# Patient Record
Sex: MALE | URBAN - METROPOLITAN AREA
[De-identification: names, ages, dates, MRNs, and addresses within clinical notes are randomized per-mention and may not be internally consistent; named-entity substitution may affect disease eponyms.]

---

## 2020-07-13 ENCOUNTER — HOSPITAL ENCOUNTER (INPATIENT)
Age: 55
LOS: 11 days | Discharge: HOME HEALTH | DRG: 948 | End: 2020-07-24
Attending: PHYSICAL MEDICINE & REHABILITATION | Admitting: PHYSICAL MEDICINE & REHABILITATION
Payer: COMMERCIAL

## 2020-07-13 PROBLEM — R53.81 DEBILITY: Status: ACTIVE | Noted: 2020-07-13

## 2020-07-13 LAB — METER GLUCOSE: 110 MG/DL (ref 74–99)

## 2020-07-13 PROCEDURE — 82962 GLUCOSE BLOOD TEST: CPT

## 2020-07-13 PROCEDURE — 1280000000 HC REHAB R&B

## 2020-07-13 PROCEDURE — 6370000000 HC RX 637 (ALT 250 FOR IP): Performed by: PHYSICAL MEDICINE & REHABILITATION

## 2020-07-13 RX ORDER — CITALOPRAM 20 MG/1
10 TABLET ORAL DAILY
Status: DISCONTINUED | OUTPATIENT
Start: 2020-07-14 | End: 2020-07-24 | Stop reason: HOSPADM

## 2020-07-13 RX ORDER — CHLORTHALIDONE 25 MG/1
25 TABLET ORAL DAILY
Status: DISCONTINUED | OUTPATIENT
Start: 2020-07-14 | End: 2020-07-24 | Stop reason: HOSPADM

## 2020-07-13 RX ORDER — PANTOPRAZOLE SODIUM 40 MG/1
40 TABLET, DELAYED RELEASE ORAL
Status: DISCONTINUED | OUTPATIENT
Start: 2020-07-14 | End: 2020-07-24 | Stop reason: HOSPADM

## 2020-07-13 RX ORDER — QUETIAPINE FUMARATE 25 MG/1
25 TABLET, FILM COATED ORAL 2 TIMES DAILY
Status: DISCONTINUED | OUTPATIENT
Start: 2020-07-13 | End: 2020-07-20

## 2020-07-13 RX ORDER — POLYETHYLENE GLYCOL 3350 17 G/17G
17 POWDER, FOR SOLUTION ORAL DAILY PRN
Status: DISCONTINUED | OUTPATIENT
Start: 2020-07-13 | End: 2020-07-24 | Stop reason: HOSPADM

## 2020-07-13 RX ORDER — ATORVASTATIN CALCIUM 40 MG/1
40 TABLET, FILM COATED ORAL DAILY
Status: DISCONTINUED | OUTPATIENT
Start: 2020-07-14 | End: 2020-07-24 | Stop reason: HOSPADM

## 2020-07-13 RX ORDER — ACETAMINOPHEN 325 MG/1
650 TABLET ORAL EVERY 4 HOURS PRN
Status: DISCONTINUED | OUTPATIENT
Start: 2020-07-13 | End: 2020-07-24 | Stop reason: HOSPADM

## 2020-07-13 RX ORDER — ARFORMOTEROL TARTRATE 15 UG/2ML
15 SOLUTION RESPIRATORY (INHALATION) 2 TIMES DAILY
Status: DISCONTINUED | OUTPATIENT
Start: 2020-07-13 | End: 2020-07-24 | Stop reason: HOSPADM

## 2020-07-13 RX ORDER — CHOLECALCIFEROL (VITAMIN D3) 125 MCG
5 CAPSULE ORAL NIGHTLY PRN
Status: DISCONTINUED | OUTPATIENT
Start: 2020-07-13 | End: 2020-07-24 | Stop reason: HOSPADM

## 2020-07-13 RX ORDER — BUDESONIDE 0.5 MG/2ML
0.5 INHALANT ORAL 2 TIMES DAILY
Status: DISCONTINUED | OUTPATIENT
Start: 2020-07-13 | End: 2020-07-24 | Stop reason: HOSPADM

## 2020-07-13 RX ORDER — OXYCODONE HYDROCHLORIDE 5 MG/1
5 TABLET ORAL EVERY 6 HOURS PRN
Status: DISCONTINUED | OUTPATIENT
Start: 2020-07-13 | End: 2020-07-20

## 2020-07-13 RX ORDER — AMLODIPINE BESYLATE 5 MG/1
5 TABLET ORAL DAILY
Status: DISCONTINUED | OUTPATIENT
Start: 2020-07-14 | End: 2020-07-24 | Stop reason: HOSPADM

## 2020-07-13 RX ADMIN — QUETIAPINE FUMARATE 25 MG: 25 TABLET ORAL at 22:41

## 2020-07-13 ASSESSMENT — PAIN SCALES - GENERAL: PAINLEVEL_OUTOF10: 0

## 2020-07-13 NOTE — LETTER
PORTABLE PATIENT PROFILE  Stew Anglin  [id number]    MEDICAL DIAGNOSIS/CONDITION:   Patient Active Problem List   Diagnosis    Debility    [facility]) [facility]    History of asthma    MAYRA (obstructive sleep apnea)    Essential hypertension    Acute encephalopathy    Colon perforation (HCC)    S/P colostomy ([facility]    Hyponatremia    Hypokalemia    Impaired mobility and ADLs    Moderate protein-calorie malnutrition (HCC)       INSURANCE INFORMATION:  Payor: JOYA /  /  /     ADVANCED DIRECTIVES:      [unfilled]     EMERGENCY CONTACT:       RISK FACTORS:   Social History     Tobacco Use    Smoking status: Not on file   Substance Use Topics    Alcohol use: Not on file       ALLERGIES:  No Known Allergies    IMMUNIZATIONS:    There is no immunization history on file for this patient. SWALLOWING:        VISION AND HEARING:        PHYSICIANS INVOLVED WITH CARE:    Len Han MD  No ref. provider found  No primary care provider on file.   Len Han MD

## 2020-07-13 NOTE — PROGRESS NOTES
Patient oriented to room and new admission folder given. No patient Guide present.  Nereida Wilhelm  7/13/2020  7:07 PM

## 2020-07-14 LAB
ANION GAP SERPL CALCULATED.3IONS-SCNC: 14 MMOL/L (ref 7–16)
ANISOCYTOSIS: ABNORMAL
BACTERIA: ABNORMAL /HPF
BASOPHILS ABSOLUTE: 0 E9/L (ref 0–0.2)
BASOPHILS RELATIVE PERCENT: 0.5 % (ref 0–2)
BILIRUBIN URINE: NEGATIVE
BLOOD, URINE: ABNORMAL
BUN BLDV-MCNC: 18 MG/DL (ref 6–20)
CALCIUM SERPL-MCNC: 9.1 MG/DL (ref 8.6–10.2)
CHLORIDE BLD-SCNC: 90 MMOL/L (ref 98–107)
CHLORIDE URINE RANDOM: 53 MMOL/L
CLARITY: CLEAR
CO2: 24 MMOL/L (ref 22–29)
COLOR: YELLOW
CREAT SERPL-MCNC: 2.1 MG/DL (ref 0.7–1.2)
CREATININE URINE: 77 MG/DL (ref 40–278)
EOSINOPHILS ABSOLUTE: 0.11 E9/L (ref 0.05–0.5)
EOSINOPHILS RELATIVE PERCENT: 0.9 % (ref 0–6)
EPITHELIAL CELLS, UA: ABNORMAL /HPF
GFR AFRICAN AMERICAN: 40
GFR NON-AFRICAN AMERICAN: 33 ML/MIN/1.73
GLUCOSE BLD-MCNC: 110 MG/DL (ref 74–99)
GLUCOSE URINE: NEGATIVE MG/DL
HCT VFR BLD CALC: 33.4 % (ref 37–54)
HEMOGLOBIN: 10.7 G/DL (ref 12.5–16.5)
HOWELL-JOLLY BODIES: ABNORMAL
HYPOCHROMIA: ABNORMAL
INR BLD: 1.2
KETONES, URINE: NEGATIVE MG/DL
LEUKOCYTE ESTERASE, URINE: NEGATIVE
LYMPHOCYTES ABSOLUTE: 4.7 E9/L (ref 1.5–4)
LYMPHOCYTES RELATIVE PERCENT: 36.6 % (ref 20–42)
MAGNESIUM: 1.9 MG/DL (ref 1.6–2.6)
MCH RBC QN AUTO: 28.5 PG (ref 26–35)
MCHC RBC AUTO-ENTMCNC: 32 % (ref 32–34.5)
MCV RBC AUTO: 89.1 FL (ref 80–99.9)
METER GLUCOSE: 112 MG/DL (ref 74–99)
METER GLUCOSE: 130 MG/DL (ref 74–99)
MONOCYTES ABSOLUTE: 1.65 E9/L (ref 0.1–0.95)
MONOCYTES RELATIVE PERCENT: 13.4 % (ref 2–12)
NEUTROPHILS ABSOLUTE: 6.22 E9/L (ref 1.8–7.3)
NEUTROPHILS RELATIVE PERCENT: 49.1 % (ref 43–80)
NITRITE, URINE: NEGATIVE
OSMOLALITY URINE: 303 MOSM/KG (ref 300–900)
PARATHYROID HORMONE INTACT: 29 PG/ML (ref 15–65)
PDW BLD-RTO: 16 FL (ref 11.5–15)
PH UA: 5.5 (ref 5–9)
PHOSPHORUS: 3.2 MG/DL (ref 2.5–4.5)
PLATELET # BLD: 535 E9/L (ref 130–450)
PMV BLD AUTO: 10.2 FL (ref 7–12)
POIKILOCYTES: ABNORMAL
POLYCHROMASIA: ABNORMAL
POTASSIUM REFLEX MAGNESIUM: 3 MMOL/L (ref 3.5–5)
POTASSIUM, UR: 45.2 MMOL/L
PROTEIN UA: ABNORMAL MG/DL
PROTHROMBIN TIME: 13.2 SEC (ref 9.3–12.4)
RBC # BLD: 3.75 E12/L (ref 3.8–5.8)
RBC UA: ABNORMAL /HPF (ref 0–2)
SODIUM BLD-SCNC: 128 MMOL/L (ref 132–146)
SODIUM URINE: 32 MMOL/L
SPECIFIC GRAVITY UA: 1.01 (ref 1–1.03)
TARGET CELLS: ABNORMAL
UROBILINOGEN, URINE: 0.2 E.U./DL
WBC # BLD: 12.7 E9/L (ref 4.5–11.5)
WBC UA: ABNORMAL /HPF (ref 0–5)

## 2020-07-14 PROCEDURE — 97530 THERAPEUTIC ACTIVITIES: CPT

## 2020-07-14 PROCEDURE — 94640 AIRWAY INHALATION TREATMENT: CPT

## 2020-07-14 PROCEDURE — 83935 ASSAY OF URINE OSMOLALITY: CPT

## 2020-07-14 PROCEDURE — 97162 PT EVAL MOD COMPLEX 30 MIN: CPT

## 2020-07-14 PROCEDURE — 84300 ASSAY OF URINE SODIUM: CPT

## 2020-07-14 PROCEDURE — 36415 COLL VENOUS BLD VENIPUNCTURE: CPT

## 2020-07-14 PROCEDURE — 97166 OT EVAL MOD COMPLEX 45 MIN: CPT

## 2020-07-14 PROCEDURE — 83735 ASSAY OF MAGNESIUM: CPT

## 2020-07-14 PROCEDURE — 1280000000 HC REHAB R&B

## 2020-07-14 PROCEDURE — 97606 NEG PRS WND THER DME>50 SQCM: CPT

## 2020-07-14 PROCEDURE — 97535 SELF CARE MNGMENT TRAINING: CPT

## 2020-07-14 PROCEDURE — 84100 ASSAY OF PHOSPHORUS: CPT

## 2020-07-14 PROCEDURE — 84133 ASSAY OF URINE POTASSIUM: CPT

## 2020-07-14 PROCEDURE — 85025 COMPLETE CBC W/AUTO DIFF WBC: CPT

## 2020-07-14 PROCEDURE — 87088 URINE BACTERIA CULTURE: CPT

## 2020-07-14 PROCEDURE — 80048 BASIC METABOLIC PNL TOTAL CA: CPT

## 2020-07-14 PROCEDURE — 82962 GLUCOSE BLOOD TEST: CPT

## 2020-07-14 PROCEDURE — 6360000002 HC RX W HCPCS: Performed by: PHYSICAL MEDICINE & REHABILITATION

## 2020-07-14 PROCEDURE — 92523 SPEECH SOUND LANG COMPREHEN: CPT

## 2020-07-14 PROCEDURE — 85610 PROTHROMBIN TIME: CPT

## 2020-07-14 PROCEDURE — 82570 ASSAY OF URINE CREATININE: CPT

## 2020-07-14 PROCEDURE — 81001 URINALYSIS AUTO W/SCOPE: CPT

## 2020-07-14 PROCEDURE — 97605 NEG PRS WND THER DME<=50SQCM: CPT

## 2020-07-14 PROCEDURE — 82436 ASSAY OF URINE CHLORIDE: CPT

## 2020-07-14 PROCEDURE — 2580000003 HC RX 258: Performed by: INTERNAL MEDICINE

## 2020-07-14 PROCEDURE — 99223 1ST HOSP IP/OBS HIGH 75: CPT | Performed by: PHYSICAL MEDICINE & REHABILITATION

## 2020-07-14 PROCEDURE — 97110 THERAPEUTIC EXERCISES: CPT

## 2020-07-14 PROCEDURE — 6370000000 HC RX 637 (ALT 250 FOR IP): Performed by: PHYSICAL MEDICINE & REHABILITATION

## 2020-07-14 PROCEDURE — 94664 DEMO&/EVAL PT USE INHALER: CPT

## 2020-07-14 PROCEDURE — 83970 ASSAY OF PARATHORMONE: CPT

## 2020-07-14 RX ORDER — ONDANSETRON 4 MG/1
4 TABLET, ORALLY DISINTEGRATING ORAL EVERY 6 HOURS PRN
Status: DISCONTINUED | OUTPATIENT
Start: 2020-07-14 | End: 2020-07-24 | Stop reason: HOSPADM

## 2020-07-14 RX ORDER — SODIUM CHLORIDE 9 MG/ML
INJECTION, SOLUTION INTRAVENOUS CONTINUOUS
Status: DISCONTINUED | OUTPATIENT
Start: 2020-07-14 | End: 2020-07-18

## 2020-07-14 RX ORDER — HEPARIN SODIUM 10000 [USP'U]/ML
5000 INJECTION, SOLUTION INTRAVENOUS; SUBCUTANEOUS EVERY 8 HOURS
Status: DISCONTINUED | OUTPATIENT
Start: 2020-07-14 | End: 2020-07-20

## 2020-07-14 RX ADMIN — AMLODIPINE BESYLATE 5 MG: 5 TABLET ORAL at 08:38

## 2020-07-14 RX ADMIN — Medication 5 MG: at 21:23

## 2020-07-14 RX ADMIN — POTASSIUM BICARBONATE 20 MEQ: 782 TABLET, EFFERVESCENT ORAL at 21:20

## 2020-07-14 RX ADMIN — HEPARIN SODIUM 5000 UNITS: 10000 INJECTION INTRAVENOUS; SUBCUTANEOUS at 23:54

## 2020-07-14 RX ADMIN — POTASSIUM BICARBONATE 20 MEQ: 782 TABLET, EFFERVESCENT ORAL at 11:08

## 2020-07-14 RX ADMIN — ONDANSETRON 4 MG: 4 TABLET, ORALLY DISINTEGRATING ORAL at 14:35

## 2020-07-14 RX ADMIN — CHLORTHALIDONE 25 MG: 25 TABLET ORAL at 08:38

## 2020-07-14 RX ADMIN — BUDESONIDE 500 MCG: 0.5 SUSPENSION RESPIRATORY (INHALATION) at 20:10

## 2020-07-14 RX ADMIN — QUETIAPINE FUMARATE 25 MG: 25 TABLET ORAL at 21:20

## 2020-07-14 RX ADMIN — QUETIAPINE FUMARATE 25 MG: 25 TABLET ORAL at 08:38

## 2020-07-14 RX ADMIN — OXYCODONE 5 MG: 5 TABLET ORAL at 23:53

## 2020-07-14 RX ADMIN — PANTOPRAZOLE SODIUM 40 MG: 40 TABLET, DELAYED RELEASE ORAL at 06:34

## 2020-07-14 RX ADMIN — HEPARIN SODIUM 5000 UNITS: 10000 INJECTION INTRAVENOUS; SUBCUTANEOUS at 15:59

## 2020-07-14 RX ADMIN — ATORVASTATIN CALCIUM 40 MG: 40 TABLET, FILM COATED ORAL at 08:39

## 2020-07-14 RX ADMIN — ONDANSETRON 4 MG: 4 TABLET, ORALLY DISINTEGRATING ORAL at 07:04

## 2020-07-14 RX ADMIN — Medication 5 MG: at 03:30

## 2020-07-14 RX ADMIN — BUDESONIDE 500 MCG: 0.5 SUSPENSION RESPIRATORY (INHALATION) at 08:23

## 2020-07-14 RX ADMIN — CITALOPRAM 10 MG: 20 TABLET, FILM COATED ORAL at 08:39

## 2020-07-14 RX ADMIN — HEPARIN SODIUM 5000 UNITS: 10000 INJECTION INTRAVENOUS; SUBCUTANEOUS at 07:05

## 2020-07-14 RX ADMIN — ARFORMOTEROL TARTRATE 15 MCG: 15 SOLUTION RESPIRATORY (INHALATION) at 08:23

## 2020-07-14 RX ADMIN — ARFORMOTEROL TARTRATE 15 MCG: 15 SOLUTION RESPIRATORY (INHALATION) at 20:10

## 2020-07-14 RX ADMIN — SODIUM CHLORIDE: 9 INJECTION, SOLUTION INTRAVENOUS at 17:47

## 2020-07-14 ASSESSMENT — PAIN DESCRIPTION - PAIN TYPE: TYPE: ACUTE PAIN;SURGICAL PAIN

## 2020-07-14 ASSESSMENT — PAIN DESCRIPTION - FREQUENCY: FREQUENCY: CONTINUOUS

## 2020-07-14 ASSESSMENT — PAIN SCALES - GENERAL
PAINLEVEL_OUTOF10: 7
PAINLEVEL_OUTOF10: 0
PAINLEVEL_OUTOF10: 0

## 2020-07-14 ASSESSMENT — PAIN DESCRIPTION - ONSET: ONSET: ON-GOING

## 2020-07-14 ASSESSMENT — PAIN DESCRIPTION - DESCRIPTORS: DESCRIPTORS: ACHING;CONSTANT

## 2020-07-14 ASSESSMENT — PAIN DESCRIPTION - ORIENTATION: ORIENTATION: LOWER;MID

## 2020-07-14 ASSESSMENT — PAIN DESCRIPTION - PROGRESSION: CLINICAL_PROGRESSION: NOT CHANGED

## 2020-07-14 ASSESSMENT — PAIN DESCRIPTION - LOCATION: LOCATION: BACK

## 2020-07-14 NOTE — PROGRESS NOTES
OCCUPATIONAL THERAPY INITIAL EVALUATION      Date:2020  Patient Name: Ghulam Sommer  MRN: [id number]  : 1965  Room: [address] [place] [address]-A    Discharge Recommendations: TBD as pt progresses - Consider HH OT  Frequency / Duration: BID 5-7 tx/wk; 3 weeks  Recommended Adaptive Equipment: TBD as pt progresses - Shower Chair, Adaptive equipment         Diagnosis: Debility    HPI/PROCEDURES: History obtained from outside hospital chart and patient. 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 He recently had a few week history of chest pain and SOB and was found to have a large diaphragmatic hernia. He underwent repair with mesh and lower pulmonary wedge and bullae resection with cryoablation and left chest tube placement on 6/3/2020 at [facility] in [place]. Post operative course was noted for ARLENE. He developed acute hypoxic respiratory failure on 2020 and was transferred to SICU requiring intubation. Respiratory cultures grew E. coli and H. Influenza. On 2020 he was found to have bowel herniation into thoracic cavity with perforation and stool draining from his chest tube. He went back to the OR for open redo diaphragmatic hernia repair and partial colectomy for ischemic transverse colon with stoma placement on 6/10/2020. He then became septic, found to be in septic shock. Per notes he was treated with antibiotics per ID recommendations, weaned off pressors and extubated. He then had an episode of ARF due to mucous plugging requiring intubation and bronchoscopy. He was again extubated on 2020. He was reintubated on  due to desaturations and chest xray showed opacification of the left hemithorax. He again underwent bronchoscopy showing mucous plugging. He improved after bronchoscopy and lavage and he was extubated on . Course noted for acute encephalopathy.  He maintained a wound vac to his left thoracotomy site and an abdominal wound vac on the Equipment Needs, Energy Conservation Techniques, Pt/Family Education, Ther Ex., endurance and balance ex. OT role and POC reviewed. Patient verbalized/demonstrated Good understanding of all education provided. Long Term Goals: 3 weeks  1. Pt will complete Self-Feeding Mod I  2. Pt will complete Hygiene/Grooming Mod I while standing at the sink w/ use of AD PRN  3. Pt will complete UB/LB Dressing/Bathing, Toileting Mod I while seated/standing PRN w/ use of DME/AD/AE PRN  4. Pt will complete Toilet/Walk-in Shower Transfers Mod I w/ use of DME/PRN  5. Pt will INDly demo Good Activity Tolerance, Good Safety Awareness w/ completion of all Functional Ax. Assessment of current deficits   Functional mobility [x]  ROM [] Strength [x]  Cognition []  ADLs [x]   IADLs [x] Safety Awareness [] Endurance [x]  Fine Motor Coordination [] Balance [x] Vision/perception [] Sensation []   Gross Motor Coordination []      Plan of Care:   ADL retraining [x]   Equipment needs [x]   Neuromuscular re-education [] Energy Conservation Techniques [x]  Functional Transfer training [x] Patient and/or Family Education [x]  Functional Mobility training [x]  Environmental Modifications [x]  Cognitive re-training []   Compensatory techniques for ADLs [x]  Splinting Needs []   Positioning/joint protection [x]  Other: []     Upon arrival, pt was found in supine. He was agreeable to participate in therapeutic ax. No family members present during session. Received permission from RN prior to engaging pt in OT services. Treatment/ Education Provided:      -- Education:  Provided Pt/Family ed re: Benefits/Purpose of OT services;  OT Plan of Care;  Oriented pt to General Rehab Program/Therapy Schedule; Reviewed Precautions and techs to adhere to precautions w/ completion of ADLs/Mobility; Techs to increase Safety/Safety Awareness w/ ADLs, Functional Transfers and Functional Mobility;   Walker safety w/ Functional Activity/Mobility;

## 2020-07-14 NOTE — FLOWSHEET NOTE
Inpatient Wound Care(initial evaluation)  [id number]    Admit Date: 7/13/2020  6:38 PM    Reason for consult:  Wound vac management, new colostomy    Wound history:  Transferred from [facility] for rehab after multiple surgeries    Findings:     07/14/20 1305   Wound 07/13/20 Abdomen Mid;Proximal   Date First Assessed/Time First Assessed: 07/13/20 2130   Present on Hospital Admission: Yes  Primary Wound Type: Surgical Type  Location: Abdomen  Wound Location Orientation: Mid;Proximal   Wound Image    Wound Non-Healing Surgical   Dressing Changed Changed/New   Dressing/Treatment Vacuum dressing   Wound Cleansed Rinsed/Irrigated with saline   Dressing Change Due 07/17/20   Wound Length (cm) 3.1 cm   Wound Width (cm) 2 cm   Wound Depth (cm) 1.6 cm   Wound Surface Area (cm^2) 6.2 cm^2   Wound Volume (cm^3) 9.92 cm^3   Wound Assessment Red;Slough   Drainage Amount Scant   Drainage Description Serosanguinous   Odor None   Alejandra-wound Assessment Intact   Red%Wound Bed 40   Yellow%Wound Bed 60   Wound 07/13/20 Chest Left;Lateral   Date First Assessed/Time First Assessed: 07/13/20 2130   Present on Hospital Admission: Yes  Location: Chest  Wound Location Orientation: Left;Lateral   Wound   (old chest tube site per chart)   Dressing Changed Changed/New   Dressing/Treatment Vacuum dressing   Wound Cleansed Rinsed/Irrigated with saline   Wound Length (cm) 0.8 cm   Wound Width (cm) 0.6 cm   Wound Depth (cm) 3 cm   Wound Surface Area (cm^2) 0.48 cm^2   Wound Volume (cm^3) 1.44 cm^3   Wound Assessment Red   Drainage Amount None   Alejandra-wound Assessment Intact   Red%Wound Bed 100   Wound 07/13/20 Left;Lateral rib cage   Date First Assessed/Time First Assessed: 07/13/20 2130   Present on Hospital Admission: Yes  Wound Location Orientation: Left;Lateral  Wound Description (Comments): rib cage   Wound Image    Wound Non-Healing Surgical   Dressing Changed Changed/New   Dressing/Treatment Vacuum dressing   Wound Cleansed Rinsed/Irrigated with saline   Wound Length (cm) 2 cm   Wound Width (cm) 14 cm   Wound Depth (cm) 5 cm   Wound Surface Area (cm^2) 28 cm^2   Wound Volume (cm^3) 140 cm^3   Wound Assessment Red   Drainage Amount None   Alejandra-wound Assessment Intact   Red%Wound Bed 100   Wound 07/14/20 Abdomen Distal;Mid   Date First Assessed/Time First Assessed: 07/14/20 1305   Present on Hospital Admission: Yes  Primary Wound Type: Surgical Type  Location: Abdomen  Wound Location Orientation: Distal;Mid   Wound Image   (see proximal)   Wound Non-Healing Surgical   Dressing Changed Changed/New   Dressing/Treatment Vacuum dressing   Wound Cleansed Rinsed/Irrigated with saline   Wound Length (cm) 10 cm   Wound Width (cm) 6.2 cm   Wound Depth (cm) 5.6 cm   Wound Surface Area (cm^2) 62 cm^2   Wound Volume (cm^3) 347.2 cm^3   Wound Assessment Red;Slough   Drainage Amount Scant   Drainage Description Serosanguinous   Odor None   Alejandra-wound Assessment   (necrotic wound edges 7-11)   Red%Wound Bed 40   Yellow%Wound Bed 60   Negative Pressure Wound Therapy Left;Lateral   Placement Date/Time: 07/14/20 1305   Pre-existing: No  Inserted by: rib cage area  Wound Location Orientation: Left;Lateral   $ Standard NPWT <=50 sq cm PER TX $ Yes   Wound Type Surgical   Dressing Type Black foam   Number of pieces used 3   Cycle Continuous   Target Pressure (mmHg) 125   Canister changed?   (new)   Dressing Changed Changed/New   Negative Pressure Wound Therapy Abdomen Mid;Proximal;Distal   Placement Date/Time: 07/14/20 1305   Pre-existing: No  Inserted by: times 2 sites  Location: Abdomen  Wound Location Orientation: Mid;Proximal;Distal   $ Standard NPWT >50 sq cm PER TX $ Yes   Wound Type Surgical   Dressing Type Black foam   Number of pieces used 6   Cycle Continuous   Target Pressure (mmHg) 125   Canister changed?   (new)   Colostomy RUQ Transverse   Placement Date: 06/09/20   Pre-existing: Yes  Location: RUQ  Colostomy Type: Transverse   Stomal Appliance 2 piece; Changed Stoma  Assessment Moist;Protrudes; Red   Mucocutaneous Junction Separation   Peristomal Assessment Intact   Treatment Bag change  (skin care)   Stool Appearance Soft   Stool Color Brown   Stool Amount Medium     **Informed Consent**    The patient has given verbal consent to have photos taken of wounds and inserted into their chart as part of their permanent medical record for purposes of documentation, treatment management and/or medical review. All Images taken on 7/14/20 of patient name: Kimberly Skates were transmitted and stored on secured [employer] located within [facility] by a registered [employer] Mobile Application Device.      Plan:  Next wound vac change Friday  Will follow for wound vac management and ostomy teaching closer to discharge    Swathi Correa 7/14/2020 1:53 PM

## 2020-07-14 NOTE — PROGRESS NOTES
Physical Therapy  Daily Treatment Note   Evaluating Therapist: Colby Kwong, FRANCHESCA [id number]    NAME: Julianne De Santiago  ROOM: 3793A  DIAGNOSIS: Debility  PRECAUTIONS: Falls, wound vac   HPI/PROCUDRES: History obtained from outside hospital chart and patient. 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 He recently had a few week history of chest pain and SOB and was found to have a large diaphragmatic hernia. He underwent repair with mesh and lower pulmonary wedge and bullae resection with cryoablation and left chest tube placement on 6/3/2020 at [facility]. Post operative course was noted for ARLENE. He developed acute hypoxic respiratory failure on 6/5/2020 and was transferred to SICU requiring intubation. Respiratory cultures grew E. coli and H. Influenza. On 6/9/2020 he was found to have bowel herniation into thoracic cavity with perforation and stool draining from his chest tube. He went back to the OR for open redo diaphragmatic hernia repair and partial colectomy for ischemic transverse colon with stoma placement on 6/10/2020. He then became septic, found to be in septic shock. Per notes he was treated with antibiotics per ID recommendations, weaned off pressors and extubated. He then had an episode of ARF due to mucous plugging requiring intubation and bronchoscopy. He was again extubated on 6/12/2020. He was reintubated on 6/22 due to desaturations and chest xray showed opacification of the left hemithorax. He again underwent bronchoscopy showing mucous plugging. He improved after bronchoscopy and lavage and he was extubated on 6/25. Course noted for acute encephalopathy. He maintained a wound vac to his left thoracotomy site and an abdominal wound vac on the laparotomy site, changed every Tues, Thurs, and Sat, along with packing of the original chest tube site. He completed antibiotic course per ID on 7/1/2020.  He passed MBS on 7/6 and was started on a liquid diet, which was advanced to a soft diet on 7/9. Social:  Pt lives with his wife in a ranch style floor plan with 1 step and no rails to enter. 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  714/20      P. M. Short Term Goals Long Term Goals    Was pt agreeable to Eval/treatment? yes Initial Evaluation yes     Does pt have pain? No c/o pain but reported nausea  Pt c/o nausea     Bed Mobility  Rolling: SBA  Supine to sit: SBA  Sit to supine: SBA  Scooting: SBA  Rolling SBA  Sit to supine SBA  Scooting SBA Supervision Independent   Transfers Sit to stand: CGA  Stand to sit: CGA  Stand pivot: CGA with [employer]  Sit to stand SBA  Stand to sit SBA  Stand pivot CGA Supervision Independent   Ambulation   20 feet with WW with CGA  50 feet x1 and 30 feet x1 with ww  feet with no AD with Supervision >200 feet with no AD with Independent   Walking 10 feet on uneven surface 10 feet with WW with CGA  N/T 10 feet with no AD with Supervision 10 feet with no AD with Independent   Wheel Chair Mobility NT  N/T     Car Transfers NT  N/T Supervision Independent   Stair negotiation: ascended and descended 4 steps with 2 rail with CGA - backward descending  2 steps 2 rails descending backwards SBA 4 steps with 1 rail with Supervision 4 steps with 1 rail with Mod Independent   Curb Step:   ascended and descended 7.5 inch step with [employer] and CGA  N/T 7.5 inch step with no AD and Supervision 7.5 inch step with no AD and Independent   Picking up object off the floor Picked up a cone with CGA  N/T Will  an object with Supervision Will  an object with Independent   BLE ROM WFL       BLE Strength 4+/5       Balance  Sitting: Supervision  Standing: CGA with [employer]  Sitting Sup  Standing CGA     Date Family Teach Completed TBA  TBA     Is additional Family Teaching Needed?   Y or N Y  yes     Hindering Progress Weakness, debility  debility     PT recommended ELOS 2-3 weeks Team's Discharge Plan        Therapist at Team Meeting          Pt is alert and Oriented x3  Sensation: Unremarkable  Edema: Unremarkable  Endurance: Fair  Skin was inspected: Unremarkable  Chair alarm: NA          Comments:  Pt c/o fatigue throughout tx session. Pt still c/o nausea but decreased. Increased amb distance this p.m. Assisted pt back to bed at end of tx session. Patient education  Pt educated on role of PT, sit<>stand technique, benefits of continued PT services to progress toward goals    Patient response to education:   Pt verbalized understanding Pt demonstrated skill Pt requires further education in this area   eys With cues yes     Rehab potential is Good for reaching above PT goals. PLAN  PT care will be provided in accordance with the objectives noted above. Whenever appropriate, clear delegation orders will be provided for nursing staff. Exercises and functional mobility practice will be used as well as appropriate assistive devices or modalities to obtain goals. Patient and family education will also be administered as needed. Frequency of treatments will be 2x/day M-F and 1x/day Sat or Sun x  2-3 weeks.     Time in: 14:50  Time out: 15:35    Monique Duncan [id number]

## 2020-07-14 NOTE — PROGRESS NOTES
Occupational Therapy  OCCUPATIONAL THERAPY DAILY NOTE    Date:2020  Patient Name: Maya Rutledge  MRN: [id number]  : 1965  Room: [address] [place] [address]A     Patient Active Problem List   Diagnosis    Debility       Precautions: falls, abdominal precautions    Functional Assessment:   Date Status AE  Comments   Feeding 20 Set up/Sup  Per eval   Grooming 20 Set up/Sup  ''   Bathing 20 Mod A  ''   UB Dressing 20 Min A  ''   LB Dressing 20 Mod A   ''   Homemaking         Functional Transfers / Balance:   Date Status DME  Comments   Sit Balance 20 Close S  Per eval   Stand Balance 20 Min A ww ''   [] Tub  [] Shower   Transfer       Commode   Transfer 20 Mod A  ''   Functional   Mobility 20 Min A ww    Other:  Bed>WC   20   Min A   ww      Functional Exercises / Activity:  Mod resistive mary bar X 20 reps on 4 planes to increase BUE forearm, wrist and  strength for independence with functional transfers and tasks. 2# free weight 3 X 10 shoulder flexion ad scapular protraction/retraction to increase endurance and BUE strength for independence with transfers and ADLs. Sensory / Neuromuscular Re-Education:      Cognitive Skills:   Status Comments   Problem   Solving good     Memory good     Sequencing good     Safety good      Visual Perception:    Education:  Pt educated on safety during functional transfers. [] Family teach completed on:    Pain Level: 0/10 c/o pain. Pt c/o nausea- nursing notified. Additional Notes:       Patient has made good  progress during treatment sessions toward set goals. Therapy emphasis to obtain goals:ADLs, transfers, mobility, strengthening, endurance and LUE ROM. [x] Continue with current OT Plan of care.   [] Prepare for Discharge     DISCHARGE RECOMMENDATIONS  Recommended DME:    Post Discharge Care:   []Home Independently  []Home with 24hr Care / Supervision []Home with Partial Supervision []Home with Home Health OT []Home with Out Pt OT []Other: ___   Comments:         Time in Time out Tx Time Breakdown  Variance:   First Session    [] Individual Tx-   [] Concurrent Tx -  [] Co-Tx -   [] Group Tx -   [] Time Missed -     Second Session 2:00 2:30 [x] Individual Tx- 30 Mins  [] Concurrent Tx -  [] Co-Tx -   [] Group Tx -   [] Time Missed -     Third Session    [] Individual Tx-   [] Concurrent Tx -  [] Co-Tx -   [] Group Tx -   [] Time Missed -         Total Tx Time 30 Mins        Pete MACHUCA/KRISTIN [id number]

## 2020-07-14 NOTE — PROGRESS NOTES
SPEECH/LANGUAGE PATHOLOGY  SPEECH/LANGUAGE/COGNITIVE EVALUATION      PATIENT NAME:  Brayan Puri      :  1965         TODAY'S DATE:  2020 ROOM:  [facility]/5502-A     ADMITTING DIAGNOSIS: Debility [R53.81]    SPEECH PATHOLOGY DIAGNOSIS:    Minimal cognitive deficits; decreased motivation negatively impacted evaluation. THERAPY RECOMMENDATIONS:   Pt achieved a CLQT composite severity rating of 3.4, whereas 3.5 is considered WNL. Patient's mid to low motivation to participate should be noted in consideration of scores. Patient feels that he has not noticed any changes or deficits in personal performance. Patient does not wish to pursue ST intervention at this time. OT/PT to address any arising cognitive issues during functional tasks. FIMS SCORES      Swallowing    Current Status  TBA    Short Term Goal TBA    Long Term Goal TBA    Receptive    Current Status  7--Independent    Short Term Goal 7--Independent    Long Term Goal 7--Independent     Expressive    Current Status  7--Independent    Short Term Goal 7--Independent    Long Term Goal 7--Independent     Social Interaction    Current Status  6--Modified Independent    Short Term Goal 6--Modified Independent    Long Term Goal 6--Modified Independent         Problem Solving    Current Status  6--Modified Independent / 5- Supervision    Short Term Goal 6--Modified Independent     Long Term Goal 6--Modified Independent      Memory    Current Status  6--Modified Independent / 5- Supervision    Short Term Goal 6--Modified Independent       Long Term Goal 6--Modified Independent      Cognitive-Linguistic Quick Test (CLQT)     CLQT was administered by Speech-Language Pathologist (SLP). This assessment looks at various cognitive domains including attention, memory, executive functions, language, visuospatial skills as well as assess accuracy during a clock draw task.  Various subtests are utilized to assess for deficits in these 5 domains during different tasks. The severity rating considered for individual age of the patient (18-69 years and 70-89 years). Below are patients scores for the appropriate age group. Attention: 3 Mild  Memory: 3 Mild   Executive Function:  4- Withing Normal Limites (WNL)  Language: 3 Mild  Visuospatial Skills: 4- Withing Normal Limites (WNL)    Composite Severity Rating:   3.4- 2.5 / Mild Deficits     Composite score is based on the sum of all individual cognitive domain scores noted above, and divided by 5. Pt achieved a composite severity rating of 3.4, whereas 3.5 is considered WNL. During some subtests, pt chose to discontinue participation while he still had time remaining to complete the task. Clock Drawing composite Score:   Moderate

## 2020-07-14 NOTE — CARE COORDINATION
Social Work Assessment Summary    PCP: Dr. Neftaly Haji ([place], [place])    Patient Resides: with his spouse, Silva Lara (63). They have (2) cats. Home Architecture : They live in a [facility] with (1) entry step (0) rails. He has access to a tub/shower with curtain or a walk in shower with curtain. He prefers walk in. Will you return to your own home? Yes        Primary Caregiver: Silva Lara (60) is an NP at [employer]. They have (2) children. Selvin Chris (27) stationed at [employer], [place] active [employer]. Lenny Yun (22) just graduated from [employer] and recently got . Level of Function PTA : Pt. Reports he was independent prior to admission. 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 DME Pta  : None    Community Agency Involvement PTA : None     Do they have a medical profile: No    Family Teaching: to be scheduled    Strength: \"Motivation\"    Preference: \"To get strength and have a normal life again\"      NAME RELATION HOME # WORK # CELL #   Kayden Farias spouse   [phone]   Lindsay Sam son Verónica Drummond [place]  [phone]     Height: 5'7  Weight: 197    Pt's mailing address:  [facility] [address] [place] [address].     Carlos Dickens  7/14/2020

## 2020-07-14 NOTE — PROGRESS NOTES
Physical Therapy  Initial Evaluation  Evaluating Therapist: Eliu Johnson DPT [id number]    NAME: Jojo Yap  ROOM: [id number]  DIAGNOSIS: Debility  PRECAUTIONS: Falls, per staff member on 7/14/20 pt may be having a new wound vac placed? HPI/PROCUDRES: History obtained from outside hospital chart and patient. 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 He recently had a few week history of chest pain and SOB and was found to have a large diaphragmatic hernia. He underwent repair with mesh and lower pulmonary wedge and bullae resection with cryoablation and left chest tube placement on 6/3/2020 at [facility] in [facility]. Post operative course was noted for ARLENE. He developed acute hypoxic respiratory failure on 6/5/2020 and was transferred to SICU requiring intubation. Respiratory cultures grew E. coli and H. Influenza. On 6/9/2020 he was found to have bowel herniation into thoracic cavity with perforation and stool draining from his chest tube. He went back to the OR for open redo diaphragmatic hernia repair and partial colectomy for ischemic transverse colon with stoma placement on 6/10/2020. He then became septic, found to be in septic shock. Per notes he was treated with antibiotics per ID recommendations, weaned off pressors and extubated. He then had an episode of ARF due to mucous plugging requiring intubation and bronchoscopy. He was again extubated on 6/12/2020. He was reintubated on 6/22 due to desaturations and chest xray showed opacification of the left hemithorax. He again underwent bronchoscopy showing mucous plugging. He improved after bronchoscopy and lavage and he was extubated on 6/25. Course noted for acute encephalopathy. He maintained a wound vac to his left thoracotomy site and an abdominal wound vac on the laparotomy site, changed every Tues, Thurs, and Sat, along with packing of the original chest tube site.  He completed antibiotic course per ID on 7/1/2020. He passed MBS on 7/6 and was started on a liquid diet, which was advanced to a soft diet on 7/9. Social:  Pt lives with his wife in a ranch style floor plan with 1 step and no rails to enter. 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  714/20          Short Term Goals Long Term Goals    Was pt agreeable to Eval/treatment? yes Initial Evaluation Initial Evaluation     Does pt have pain? No c/o pain but reported nausea       Bed Mobility  Rolling: SBA  Supine to sit: SBA  Sit to supine: SBA  Scooting: SBA   Supervision Independent   Transfers Sit to stand: CGA  Stand to sit: CGA  Stand pivot: CGA with [employer]   Supervision Independent   Ambulation   20 feet with [employer] with CGA   100 feet with no AD with Supervision >200 feet with no AD with Independent   Walking 10 feet on uneven surface 10 feet with WW with CGA   10 feet with no AD with Supervision 10 feet with no AD with Independent   Wheel Chair Mobility NT       Car Transfers NT   Supervision Independent   Stair negotiation: ascended and descended 4 steps with 2 rail with CGA - backward descending   4 steps with 1 rail with Supervision 4 steps with 1 rail with Mod Independent   Curb Step:   ascended and descended 7.5 inch step with [employer] and CGA   7.5 inch step with no AD and Supervision 7.5 inch step with no AD and Independent   Picking up object off the floor Picked up a cone with CGA   Will  an object with Supervision Will  an object with Independent   BLE ROM WFL       BLE Strength 4+/5       Balance  Sitting: Supervision  Standing: CGA with [employer]       Date Family Teach Completed TBA       Is additional Family Teaching Needed?   Y or N Y       Hindering Progress Weakness, debility       PT recommended ELOS 2-3 weeks       Team's Discharge Plan        Therapist at Team Meeting          Pt is alert and Oriented x3  Sensation: Unremarkable  Edema: Unremarkable  Endurance: Fair  Skin was inspected: Unremarkable  Chair alarm: NA     ASSESSMENT  Pt displays functional ability as noted in the objective portion of this evaluation. Comments:  Pt was found lying supine in bed and agreeable to PT eval. Pt required increased time to complete functional mobility due to weakness and fatigue. Pt at times did report nausea specifically when staring straight ahead or up towards the ceiling. Pt required frequent rest breaks during eval due to nausea and fatigue. Pt overall has good understanding of his deficits as well as general safety awareness. Pt is eager to get better and discharge home. At this time pt has strength and endurance deficits that are affecting his overall level of functional mobility. Pt will require skilled PT services to improve upon deficits as noted above and progress toward goals. Patient education  Pt educated on role of PT, sit<>stand technique, benefits of continued PT services to progress toward goals    Patient response to education:   Pt verbalized understanding Pt demonstrated skill Pt requires further education in this area   eys With cues yes     Rehab potential is Good for reaching above PT goals. Pts/ family goals   1. To get better and go home    Patient and or family understand(s) diagnosis, prognosis, and plan of care: yes    PLAN  PT care will be provided in accordance with the objectives noted above. Whenever appropriate, clear delegation orders will be provided for nursing staff. Exercises and functional mobility practice will be used as well as appropriate assistive devices or modalities to obtain goals. Patient and family education will also be administered as needed. Frequency of treatments will be 2x/day M-F and 1x/day Sat or Sun x  2-3 weeks.     Time in: 1000  Time out: [address] [place], [place] [id number]

## 2020-07-14 NOTE — H&P
History and Physical Exam    Date of Admission: 7/14/2020  Primary Care Physician: No primary care provider on file. Attending Physician:  Lillian Singh MD    Chief Complaint:   Impairments and activities limitations after prolonged hospital stay with acute encephalopathy and general debility      History of Present Illness:  History obtained from outside hospital chart and patient. 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 He recently had a few week history of chest pain and SOB and was found to have a large diaphragmatic hernia. He underwent repair with mesh and lower pulmonary wedge and bullae resection with cryoablation and left chest tube placement on 6/3/2020 at [facility] in [facility] [employer] [facility]. Post operative course was noted for ARLENE. He developed acute hypoxic respiratory failure on 6/5/2020 and was transferred to SICU requiring intubation. Respiratory cultures grew E. coli and H. Influenza. On 6/9/2020 he was found to have bowel herniation into thoracic cavity with perforation and stool draining from his chest tube. He went back to the OR for open redo diaphragmatic hernia repair and partial colectomy for ischemic transverse colon with stoma placement on 6/10/2020. He then became septic, found to be in septic shock. Per notes he was treated with antibiotics per ID recommendations, weaned off pressors and extubated. He then had an episode of ARF due to mucous plugging requiring intubation and bronchoscopy. He was again extubated on 6/12/2020. He was reintubated on 6/22 due to desaturations and chest xray showed opacification of the left hemithorax. He again underwent bronchoscopy showing mucous plugging. He improved after bronchoscopy and lavage and he was extubated on 6/25. Course noted for acute encephalopathy.  He maintained a wound vac to his left thoracotomy site and an abdominal wound vac on the laparotomy site, changed every Tues, Thurs, and Sat, along with packing of the original chest tube site. He completed antibiotic course per ID on 7/1/2020. He passed MBS on 7/6 and was started on a liquid diet, which was advanced to a soft diet on 7/9. He participated in therapy evaluations and was then transferred to [facility] for the Acute inpatient rehab program. AM labs reveal hyponatremia (Na 128), hypokalemia (K 3.0), elevated BUN/Cr, and leukocytosis (WBC 12.7). Labs from OSH reviewed, noted on 7/12/2020 WBC 11.6, Na 131, k 3.6, BUN 22, and Cr 1.83.     Histories:   Past Medical History:   Congenital diaphragmatic hernia       MAYRA on CPAP/BIPAP       Asthma       Anxiety       HTN       HLD       Osteoarthritis   Past Surgical History:         Congenital diaphragmatic hernia repair as a child       History of splenectomy per records       Procedures from recent OSH admission as per HPI      Family History:  No pertinent history reported  Social History:   Social History     Socioeconomic History    Marital status:      Spouse name: Not on file    Number of children: Not on file    Years of education: Not on file    Highest education level: Not on file   Occupational History    Not on file   Social Needs    Financial resource strain: Not on file    Food insecurity     Worry: Not on file     Inability: Not on file    Transportation needs     Medical: Not on file     Non-medical: Not on file   Tobacco Use    Smoking status: Not on file   Substance and Sexual Activity    Alcohol use: Not on file    Drug use: Not on file    Sexual activity: Not on file   Lifestyle    Physical activity     Days per week: Not on file     Minutes per session: Not on file    Stress: Not on file   Relationships    Social connections     Talks on phone: Not on file     Gets together: Not on file     Attends [employer] service: Not on file     Active member of club or organization: Not on file     Attends meetings of clubs or organizations: Not on file     Relationship status: Not on file    Intimate partner violence     Fear of current or ex partner: Not on file     Emotionally abused: Not on file     Physically abused: Not on file     Forced sexual activity: Not on file   Other Topics Concern    Not on file   Social History Narrative    Not on file       Home Environment:    Lives with wife in a 1 level home with 1 step to enter    Functional Status:        Premorbid ADL's: independent   Premorbid Mobility: independent     Current Functional Status:    Physical Therapy:  Bed mobility: Min A  Transfers: Min  A   Ambulation: 35 ft [employer] Min A    Occupational Therapy:  Feeding: Setup/Supervision  Grooming: Setup / Supervision  Bathing: Mod A  UB dressing: Min A  LB dressing: Mod A    Medications    Scheduled Meds:  heparin (porcine), 5,000 Units, Q8H  amLODIPine, 5 mg, Daily  atorvastatin, 40 mg, Daily  chlorthalidone, 25 mg, Daily  citalopram, 10 mg, Daily  pantoprazole, 40 mg, QAM AC  QUEtiapine, 25 mg, BID  budesonide, 0.5 mg, BID    And  Arformoterol Tartrate, 15 mcg, BID        PRN Meds:  ondansetron, 4 mg, Q6H PRN  oxyCODONE, 5 mg, Q6H PRN  acetaminophen, 650 mg, Q4H PRN  melatonin, 5 mg, Nightly PRN  polyethylene glycol, 17 g, Daily PRN        Drug Allergies   Patient has no known allergies. Review of Systems   Constitutional:  No fevers/chills. +Generalized weakness. Eye:  Negative for vision changes. Ear/Nose/Mouth/Throat:  Negative for hearing changes. Respiratory:  No SOB, +cough, non productive, persistent since surgery at OSH. Cardiovascular:  No CP, No palpitations. Gastrointestinal:  No nausea, No vomiting, No constipation. Genitourinary:  Voiding normally, no dysuria. Hematology/Lymphatics:  Negative. Endocrine:  Negative. Musculoskeletal:  Per HPI    Integumentary:  Abdominal and chest surgical wounds  Neurologic:  Per HPI  Psychiatric:  No anxiety, No depression.        Physical Examination:   Vitals:    07/14/20 0645 07/14/20 0823 07/14/20 0824 07/14/20 0838   BP: 133/79   130/79   Pulse: 99   108   Resp: 24   28   Temp: 98.6 °F (37 °C)   98.2 °F (36.8 °C)   TempSrc: Oral   Oral   SpO2: 94% 98% 98% 93%   Weight:       Height:         General:  Resting comfortably in bed, NAD, alert. Eye:  Extraocular movements are intact, Normal conjunctiva. HENT:  Normocephalic, Normal hearing, Oral mucosa is moist.    Neck:  Supple. Respiratory:  Lungs are clear to auscultation, Breath sounds are equal.    Cardiovascular:  Regular rate, Normal rhythm, No edema. Gastrointestinal:  Soft, Non-tender, Normal bowel sounds. Integumentary:  No rashes. Mid abdominal surgical wound, left lateral chest wound (old chest tube site), L lateral rib cage surgical wound, RUQ colostomy. Dressings dry and intact. Musculoskeletal:      UE eval: Good AROM, good PROM. LE eval: Good AROM, good PROM. Muscle tone: Within normal limits. No atrophies. Psychiatric:  Cooperative, Appropriate mood & affect. Neurologic:     AAOx4  Speech clear, content appropriate  Follows multi step commands  Recall intact      Sensation:    Light touch:  Intact throughout        Pinprick: Intact throughout          Coordination:      Finger to nose: WNL b/l          Cranial nerves:    II- Grossly intact  III/IV/VI- PERRL, EOMI  V- No facial sensory loss  VII- No facial asymmmetry  VIII- Hearing normal  IX/X- Uvula midline, Symmetric soft palate elevation  XI- Shrugs shoulders equally, head turning against resistance  XII- Tongue protrudes to midline      Motor: (Manual Muscle Testing)                Shld Abd EF EE WE Int   RUE 5 5 5 5 5   LUE 5 5 5 5 5      HF KE DF GTE PF   RLE 5 5 5 5 5   LLE 5 5 5 5 5         DTRs:     Bicep Tricep BR Pat Ach   Right 2 2 2 2 2   Left 2 2 2 2 2     No pathological reflexes       Laboratory:    Lab Results   Component Value Date    WBC 12.7 (H) 07/14/2020    HGB 10.7 (L) 07/14/2020    HCT 33.4 (L) 07/14/2020    MCV 89.1 c. treatment by multiple other licensed rehabilitation professionals in a time intensive and medically coordinated program.   2. The medical stability of the patient and management of medical or surgical co-morbidities are:   a. manageable in the rehabilitation hospital; and   b. sufficiently under control so as to permit simultaneous participation in the rehabilitation program.   3. The patient is now capable of fully participating in the inpatient rehabilitation program.   4. The patient has a high probability of benefiting from the program of care.    5. The patient has a home and available family or care providers         Electronically signed by Ana María Sethi MD on 7/14/2020 at 10:30 AM

## 2020-07-14 NOTE — CONSULTS
ALT, AST, ALKPHOS, BILITOT, BILIDIR in the last 72 hours. No results for input(s): LABALBU in the last 72 hours. No results found for: FERRITIN, IRON, TIBC    No results found for: [id number]    No results found for: FOLATE      Lab Results   Component Value Date    COLORU Yellow 07/14/2020    NITRU Negative 07/14/2020    GLUCOSEU Negative 07/14/2020    KETUA Negative 07/14/2020    UROBILINOGEN 0.2 07/14/2020    BILIRUBINUR Negative 07/14/2020       No results found for: SUZAN, CREURRAN, MACREATRATIO, OSMOU    No components found for: URIC    No results found for: LIPIDPAN      Assessment and Plan:    1. Acute kidney Injury 2/2 hypotension 2/2 post-op complications including sepsis/septic shock:  -Patient had a complicated post-operative period--septic shock can lead to hypotension--hemodynamically mediated acute kidney injury-->rise in serum creatinine.  -Unsure what his baseline is, paper documents in the patient's file mention BUN: 41, creatinine: 1.71 on 07/07/20. Current BUN: normal; 18. Creatinine: 2.1  -NS 50cc/hr   -urine electrolytes  -monitor I/Os    2. Electrolyte imbalance -hyponatremia, hypokalemia  -NS 50cc should help with the hyponatremia  -20mEq Potassium-bicarb-citric acid tablets should help with the hypokalemia. Will consider IV if K continues to drop. 3. HTN  -Norvasc 5mg to be continued    4. MAYRA   -on CPAP    Thank you Dr. Brianda Paniagua for allowing us to be a part of this patient's care. Artem Bernal.  Danyelle Dubose MD    Pt seen and examined agree with above  Follow renal fn  Trial of ivf  Juliette Adler

## 2020-07-15 PROBLEM — Z78.9 IMPAIRED MOBILITY AND ADLS: Status: ACTIVE | Noted: 2020-07-15

## 2020-07-15 PROBLEM — Z87.09 HISTORY OF ASTHMA: Status: ACTIVE | Noted: 2020-07-15

## 2020-07-15 PROBLEM — G47.33 OSA (OBSTRUCTIVE SLEEP APNEA): Status: ACTIVE | Noted: 2020-07-15

## 2020-07-15 PROBLEM — E87.6 HYPOKALEMIA: Status: ACTIVE | Noted: 2020-07-15

## 2020-07-15 PROBLEM — I10 ESSENTIAL HYPERTENSION: Status: ACTIVE | Noted: 2020-07-15

## 2020-07-15 PROBLEM — N17.9 AKI (ACUTE KIDNEY INJURY) (HCC): Status: ACTIVE | Noted: 2020-07-15

## 2020-07-15 PROBLEM — E44.0 MODERATE PROTEIN-CALORIE MALNUTRITION (HCC): Status: ACTIVE | Noted: 2020-07-15

## 2020-07-15 PROBLEM — K63.1 COLON PERFORATION (HCC): Status: ACTIVE | Noted: 2020-07-15

## 2020-07-15 PROBLEM — Z74.09 IMPAIRED MOBILITY AND ADLS: Status: ACTIVE | Noted: 2020-07-15

## 2020-07-15 PROBLEM — G93.40 ACUTE ENCEPHALOPATHY: Status: ACTIVE | Noted: 2020-07-15

## 2020-07-15 PROBLEM — E87.1 HYPONATREMIA: Status: ACTIVE | Noted: 2020-07-15

## 2020-07-15 PROBLEM — Z93.3 S/P COLOSTOMY (HCC): Status: ACTIVE | Noted: 2020-07-15

## 2020-07-15 LAB
ANION GAP SERPL CALCULATED.3IONS-SCNC: 13 MMOL/L (ref 7–16)
ANISOCYTOSIS: ABNORMAL
BASOPHILS ABSOLUTE: 0 E9/L (ref 0–0.2)
BASOPHILS RELATIVE PERCENT: 0.7 % (ref 0–2)
BUN BLDV-MCNC: 18 MG/DL (ref 6–20)
CALCIUM SERPL-MCNC: 9 MG/DL (ref 8.6–10.2)
CHLORIDE BLD-SCNC: 91 MMOL/L (ref 98–107)
CO2: 28 MMOL/L (ref 22–29)
CREAT SERPL-MCNC: 2.1 MG/DL (ref 0.7–1.2)
EOSINOPHILS ABSOLUTE: 0 E9/L (ref 0.05–0.5)
EOSINOPHILS RELATIVE PERCENT: 0.8 % (ref 0–6)
GFR AFRICAN AMERICAN: 40
GFR NON-AFRICAN AMERICAN: 33 ML/MIN/1.73
GLUCOSE BLD-MCNC: 104 MG/DL (ref 74–99)
HCT VFR BLD CALC: 32.8 % (ref 37–54)
HEMOGLOBIN: 10.6 G/DL (ref 12.5–16.5)
LYMPHOCYTES ABSOLUTE: 4.68 E9/L (ref 1.5–4)
LYMPHOCYTES RELATIVE PERCENT: 38.6 % (ref 20–42)
MAGNESIUM: 1.9 MG/DL (ref 1.6–2.6)
MCH RBC QN AUTO: 29 PG (ref 26–35)
MCHC RBC AUTO-ENTMCNC: 32.3 % (ref 32–34.5)
MCV RBC AUTO: 89.9 FL (ref 80–99.9)
METER GLUCOSE: 114 MG/DL (ref 74–99)
METER GLUCOSE: 135 MG/DL (ref 74–99)
MONOCYTES ABSOLUTE: 1.2 E9/L (ref 0.1–0.95)
MONOCYTES RELATIVE PERCENT: 9.6 % (ref 2–12)
MYELOCYTE PERCENT: 0.9 % (ref 0–0)
NEUTROPHILS ABSOLUTE: 6.24 E9/L (ref 1.8–7.3)
NEUTROPHILS RELATIVE PERCENT: 50.9 % (ref 43–80)
PDW BLD-RTO: 15.9 FL (ref 11.5–15)
PLATELET # BLD: 512 E9/L (ref 130–450)
PMV BLD AUTO: 10.2 FL (ref 7–12)
POLYCHROMASIA: ABNORMAL
POTASSIUM REFLEX MAGNESIUM: 2.9 MMOL/L (ref 3.5–5)
RBC # BLD: 3.65 E12/L (ref 3.8–5.8)
SODIUM BLD-SCNC: 132 MMOL/L (ref 132–146)
WBC # BLD: 12 E9/L (ref 4.5–11.5)

## 2020-07-15 PROCEDURE — 36415 COLL VENOUS BLD VENIPUNCTURE: CPT

## 2020-07-15 PROCEDURE — 99232 SBSQ HOSP IP/OBS MODERATE 35: CPT | Performed by: PHYSICAL MEDICINE & REHABILITATION

## 2020-07-15 PROCEDURE — 6360000002 HC RX W HCPCS: Performed by: INTERNAL MEDICINE

## 2020-07-15 PROCEDURE — 94640 AIRWAY INHALATION TREATMENT: CPT

## 2020-07-15 PROCEDURE — 2580000003 HC RX 258

## 2020-07-15 PROCEDURE — 1280000000 HC REHAB R&B

## 2020-07-15 PROCEDURE — 6370000000 HC RX 637 (ALT 250 FOR IP): Performed by: PHYSICAL MEDICINE & REHABILITATION

## 2020-07-15 PROCEDURE — 92610 EVALUATE SWALLOWING FUNCTION: CPT

## 2020-07-15 PROCEDURE — 97530 THERAPEUTIC ACTIVITIES: CPT | Performed by: OCCUPATIONAL THERAPY ASSISTANT

## 2020-07-15 PROCEDURE — 97110 THERAPEUTIC EXERCISES: CPT | Performed by: OCCUPATIONAL THERAPY ASSISTANT

## 2020-07-15 PROCEDURE — 97530 THERAPEUTIC ACTIVITIES: CPT

## 2020-07-15 PROCEDURE — 80048 BASIC METABOLIC PNL TOTAL CA: CPT

## 2020-07-15 PROCEDURE — 85025 COMPLETE CBC W/AUTO DIFF WBC: CPT

## 2020-07-15 PROCEDURE — 82962 GLUCOSE BLOOD TEST: CPT

## 2020-07-15 PROCEDURE — 6360000002 HC RX W HCPCS: Performed by: PHYSICAL MEDICINE & REHABILITATION

## 2020-07-15 PROCEDURE — 83735 ASSAY OF MAGNESIUM: CPT

## 2020-07-15 PROCEDURE — 97110 THERAPEUTIC EXERCISES: CPT

## 2020-07-15 RX ORDER — SODIUM CHLORIDE 0.9 % (FLUSH) 0.9 %
SYRINGE (ML) INJECTION
Status: COMPLETED
Start: 2020-07-15 | End: 2020-07-15

## 2020-07-15 RX ORDER — SODIUM CHLORIDE 9 MG/ML
INJECTION INTRAVENOUS
Status: DISCONTINUED
Start: 2020-07-15 | End: 2020-07-15 | Stop reason: WASHOUT

## 2020-07-15 RX ORDER — POTASSIUM CHLORIDE 7.45 MG/ML
10 INJECTION INTRAVENOUS
Status: COMPLETED | OUTPATIENT
Start: 2020-07-15 | End: 2020-07-16

## 2020-07-15 RX ADMIN — ACETAMINOPHEN 650 MG: 325 TABLET ORAL at 08:30

## 2020-07-15 RX ADMIN — POTASSIUM CHLORIDE 10 MEQ: 10 INJECTION, SOLUTION INTRAVENOUS at 18:03

## 2020-07-15 RX ADMIN — AMLODIPINE BESYLATE 5 MG: 5 TABLET ORAL at 08:31

## 2020-07-15 RX ADMIN — ATORVASTATIN CALCIUM 40 MG: 40 TABLET, FILM COATED ORAL at 08:32

## 2020-07-15 RX ADMIN — Medication 5 MG: at 20:14

## 2020-07-15 RX ADMIN — Medication: at 17:08

## 2020-07-15 RX ADMIN — HEPARIN SODIUM 5000 UNITS: 10000 INJECTION INTRAVENOUS; SUBCUTANEOUS at 08:32

## 2020-07-15 RX ADMIN — QUETIAPINE FUMARATE 25 MG: 25 TABLET ORAL at 20:14

## 2020-07-15 RX ADMIN — POTASSIUM CHLORIDE 10 MEQ: 10 INJECTION, SOLUTION INTRAVENOUS at 21:33

## 2020-07-15 RX ADMIN — POTASSIUM BICARBONATE 20 MEQ: 782 TABLET, EFFERVESCENT ORAL at 14:02

## 2020-07-15 RX ADMIN — PANTOPRAZOLE SODIUM 40 MG: 40 TABLET, DELAYED RELEASE ORAL at 07:17

## 2020-07-15 RX ADMIN — POTASSIUM BICARBONATE 20 MEQ: 782 TABLET, EFFERVESCENT ORAL at 20:14

## 2020-07-15 RX ADMIN — POTASSIUM CHLORIDE 10 MEQ: 10 INJECTION, SOLUTION INTRAVENOUS at 23:11

## 2020-07-15 RX ADMIN — BUDESONIDE 500 MCG: 0.5 SUSPENSION RESPIRATORY (INHALATION) at 08:28

## 2020-07-15 RX ADMIN — ARFORMOTEROL TARTRATE 15 MCG: 15 SOLUTION RESPIRATORY (INHALATION) at 08:28

## 2020-07-15 RX ADMIN — Medication: at 12:58

## 2020-07-15 RX ADMIN — CHLORTHALIDONE 25 MG: 25 TABLET ORAL at 08:32

## 2020-07-15 RX ADMIN — COLLAGENASE SANTYL: 250 OINTMENT TOPICAL at 17:28

## 2020-07-15 RX ADMIN — POTASSIUM CHLORIDE 10 MEQ: 10 INJECTION, SOLUTION INTRAVENOUS at 21:34

## 2020-07-15 RX ADMIN — OXYCODONE 5 MG: 5 TABLET ORAL at 20:14

## 2020-07-15 RX ADMIN — Medication: at 21:42

## 2020-07-15 RX ADMIN — POTASSIUM BICARBONATE 20 MEQ: 782 TABLET, EFFERVESCENT ORAL at 08:39

## 2020-07-15 RX ADMIN — CITALOPRAM: 20 TABLET, FILM COATED ORAL at 08:31

## 2020-07-15 RX ADMIN — HEPARIN SODIUM 5000 UNITS: 10000 INJECTION INTRAVENOUS; SUBCUTANEOUS at 16:27

## 2020-07-15 RX ADMIN — QUETIAPINE FUMARATE 25 MG: 25 TABLET ORAL at 08:31

## 2020-07-15 ASSESSMENT — PAIN DESCRIPTION - DESCRIPTORS
DESCRIPTORS: ACHING;SORE;DISCOMFORT
DESCRIPTORS: ACHING;SORE;DISCOMFORT

## 2020-07-15 ASSESSMENT — PAIN DESCRIPTION - ORIENTATION
ORIENTATION: LOWER;MID
ORIENTATION: LOWER;MID

## 2020-07-15 ASSESSMENT — PAIN SCALES - GENERAL
PAINLEVEL_OUTOF10: 0
PAINLEVEL_OUTOF10: 4
PAINLEVEL_OUTOF10: 0
PAINLEVEL_OUTOF10: 0
PAINLEVEL_OUTOF10: 6
PAINLEVEL_OUTOF10: 2

## 2020-07-15 ASSESSMENT — PAIN DESCRIPTION - PROGRESSION
CLINICAL_PROGRESSION: NOT CHANGED
CLINICAL_PROGRESSION: NOT CHANGED

## 2020-07-15 ASSESSMENT — PAIN - FUNCTIONAL ASSESSMENT
PAIN_FUNCTIONAL_ASSESSMENT: PREVENTS OR INTERFERES SOME ACTIVE ACTIVITIES AND ADLS
PAIN_FUNCTIONAL_ASSESSMENT: PREVENTS OR INTERFERES SOME ACTIVE ACTIVITIES AND ADLS

## 2020-07-15 ASSESSMENT — PAIN DESCRIPTION - FREQUENCY
FREQUENCY: INTERMITTENT
FREQUENCY: INTERMITTENT

## 2020-07-15 ASSESSMENT — PAIN DESCRIPTION - ONSET
ONSET: ON-GOING
ONSET: ON-GOING

## 2020-07-15 ASSESSMENT — PAIN DESCRIPTION - LOCATION
LOCATION: BACK
LOCATION: BACK

## 2020-07-15 ASSESSMENT — PAIN DESCRIPTION - PAIN TYPE
TYPE: ACUTE PAIN
TYPE: ACUTE PAIN

## 2020-07-15 NOTE — PROGRESS NOTES
5 mg Oral Daily    atorvastatin  40 mg Oral Daily    chlorthalidone  25 mg Oral Daily    citalopram  10 mg Oral Daily    pantoprazole  40 mg Oral QAM AC    QUEtiapine  25 mg Oral BID    budesonide  0.5 mg Nebulization BID    And    Arformoterol Tartrate  15 mcg Nebulization BID       MEDS (infusions):   sodium chloride 50 mL/hr at 07/14/20 2128       MEDS (prn):  ondansetron, oxyCODONE, acetaminophen, melatonin, polyethylene glycol    DATA:    Recent Labs     07/14/20  0625 07/15/20  0439   WBC 12.7* 12.0*   HGB 10.7* 10.6*   HCT 33.4* 32.8*   MCV 89.1 89.9   * 512*     Recent Labs     07/14/20  0625 07/14/20  1901 07/15/20  0439   *  --  132   K 3.0*  --  2.9*   CL 90*  --  91*   CO2 24  --  28   BUN 18  --  18   CREATININE 2.1*  --  2.1*   LABGLOM 33  --  33   GLUCOSE 110*  --  104*   CALCIUM 9.1  --  9.0   MG 1.9  --  1.9   PHOS  --  3.2  --        No results found for: LABALBU  No results found for: TSH    Iron Studies  No results found for: IRON, TIBC, FERRITIN  No results found for: [id number]  No results found for: FOLATE    No results found for: VITD25  PTH   Date Value Ref Range Status   07/14/2020 29 15 - 65 pg/mL Final       No components found for: URIC    Lab Results   Component Value Date    COLORU Yellow 07/14/2020    NITRU Negative 07/14/2020    GLUCOSEU Negative 07/14/2020    KETUA Negative 07/14/2020    UROBILINOGEN 0.2 07/14/2020    BILIRUBINUR Negative 07/14/2020       No results found for: LIPIDPAN        IMPRESSION/RECOMMENDATIONS:      1. Acute kidney Injury 2/2 hypotension 2/2 post-op complications including sepsis/septic shock:  -Patient had a complicated post-operative period--septic shock can lead to hypotension--hemodynamically mediated acute kidney injury-->rise in serum creatinine.  -Unsure what his baseline is, paper documents in the patient's file mention BUN: 41, creatinine: 1.71 on 07/07/20.  Current BUN: 18. Creatinine: 2.1, unchanged from yesterday  -NS 50cc/hr   -urine electrolytes: ur. chloride: 53, ur. Creat: 77, ur osm; 303, ur K: 45.2, ur. Na: 32, FeNa: 0.7% (<1%)--prerenal etiology. -BP: 120s/70s today  -monitor I/Os  -BMP daily     2. Electrolyte imbalance -hyponatremia, hypokalemia  -Na today: 132  -Please continue NS 50cc.  -K today: 2.9 (not improving)  -20mEq Potassium-bicarb-citric acid tablets: taken 3 doses  -IV KCl, 10mEq over 1 hour, 6 doses to correct the hypokalemia     3. HTN  -Norvasc 5mg to be continued     4. MAYRA   -on CPAP    [place].  Jerilee Cowden, MD    Pt seen and examined agree with above  Ether Slider

## 2020-07-15 NOTE — PROGRESS NOTES
Comprehensive Nutrition Assessment    Type and Reason for Visit:  Initial, Wound    Nutrition Recommendations/Plan: Continue current diet order. Will add ONS to promote healing. Nutrition Assessment:  Pt admit for rehab r/t SICU stay at [facility] s/p diaphragmatic hernia repair w/complications resulting in colostomy 2/2 bowel perforation. Pt at increased nutritional risk d/t poor intake, multiple wounds/vacs. Will add ONS to promote healing. Malnutrition Assessment:  Malnutrition Status: Moderate malnutrition    Context:  Acute Illness     Findings of the 6 clinical characteristics of malnutrition:  Energy Intake:  7 - 50% or less of estimated energy requirements for 5 or more days  Weight Loss:  Unable to assess     Body Fat Loss:  1 - Mild body fat loss Orbital   Muscle Mass Loss:  1 - Mild muscle mass loss Temples (temporalis)  Fluid Accumulation:  No significant fluid accumulation     Strength:  Not Performed    Estimated Daily Nutrient Needs:  Energy (kcal):  5711-2566; Weight Used for Energy Requirements:  Admission     Protein (g):  100-110; Weight Used for Protein Requirements:  Admission(1.5-1.7 g/kg)        Fluid (ml/day):  5694-1985; Weight Used for Fluid Requirements:  Admission(1 ml / kcal)      Nutrition Related Findings:  A&O, -I/Os (-1.1L), no edema noted, abd WNL, +bs, colostomy      Wounds:  Surgical Wound, Multiple(multiple wound vacs / surgical wound / recent colostomy)       Current Nutrition Therapies:    DIET DENTAL SOFT; Carb Control: 5 carb choices (75 gms)/meal    Anthropometric Measures:  · Height: 5' 7\" (170.2 cm)  · Current Body Weight: 213 lb (96.6 kg)(7/15 bed scale)   · Admission Body Weight: 197 lb (89.4 kg)(7/13 bed scale)    · Usual Body Weight: 230 lb (104.3 kg)(Per pt report. MARJORIE d/t no wt hx EMR)     · Ideal Body Weight: 148 lbs; 143.9 lbs   · BMI: 33.4  · Adjusted Body Weight:  ;     · Adjusted BMI:      · BMI Categories: Obese Class 1 (BMI 30.0-34. 9)       Nutrition Diagnosis:   · Moderate malnutrition, In context of acute illness or injury related to altered GI function as evidenced by intake 26-50%, mild loss of subcutaneous fat, mild muscle loss      Nutrition Interventions:   Food and/or Nutrient Delivery:  Continue Current Diet, Start Oral Nutrition Supplement  Nutrition Education/Counseling:  Education initiated(Discussed ONS and adequate intake with pt.)   Coordination of Nutrition Care:  Continued Inpatient Monitoring    Goals:  Intake >50% meals and ONS.        Nutrition Monitoring and Evaluation:   Behavioral-Environmental Outcomes:      Food/Nutrient Intake Outcomes:  Food and Nutrient Intake, Supplement Intake  Physical Signs/Symptoms Outcomes:  Biochemical Data, Skin     Discharge Planning:    Continue current diet     Electronically signed by Leonid Barton RD, LD on 7/15/20 at 1:53 PM EDT    Contact:

## 2020-07-15 NOTE — PROGRESS NOTES
ww Sitting Sup  Standing CGA     Date Family Teach Completed TBA TBA TBA     Is additional Family Teaching Needed? Y or N Y yes yes     Hindering Progress Weakness, debility debility debility     PT recommended ELOS 2-3 weeks       Team's Discharge Plan        Therapist at Team Meeting          Pt is alert and Oriented x3  Sensation: Unremarkable  Edema: Unremarkable  Endurance: Fair  Skin was inspected: Unremarkable  Chair alarm: NA      Therapeutic ex: knee bends 10x, sit <> stand x5, toe raises 10x. P.m. sidestepping 10 feet x2 reps, sit <> stand x4 reps  Comments:  Pt c/o fatigue throughout tx session. Increased amb distance this a.m. Pt demonstrates good technique and balance with ww during amb. Assisted pt back to bed at end of tx session. Pt given call light. Patient education  Pt educated on role of PT,  benefits of continued PT services to progress toward goals    Patient response to education:   Pt verbalized understanding Pt demonstrated skill Pt requires further education in this area   eys With cues yes     Rehab potential is Good for reaching above PT goals. PLAN  PT care will be provided in accordance with the objectives noted above. Whenever appropriate, clear delegation orders will be provided for nursing staff. Exercises and functional mobility practice will be used as well as appropriate assistive devices or modalities to obtain goals. Patient and family education will also be administered as needed. Frequency of treatments will be 2x/day M-F and 1x/day Sat or Sun x  2-3 weeks.   Time In : 10:00  Time Out: 1045  Time in: 52 [facility]  Time out: 903 [facility] [id number]

## 2020-07-15 NOTE — PROGRESS NOTES
SPEECH/LANGUAGE PATHOLOGY  BEDSIDE SWALLOWING EVALUATION    PATIENT NAME:  Selena Garcia      :  1965          TODAY'S DATE:  7/15/2020 ROOM:  [facility]-A      SUMMARY OF EVALUATION     DYSPHAGIA DIAGNOSIS:  Within functional limits      DIET RECOMMENDATIONS: Regular consistency solids with  thin liquids     FEEDING RECOMMENDATIONS:     Assistance level:  no assistance needed      Compensatory strategies recommended:compensatory strategies are not recommended at this time    THERAPY RECOMMENDATIONS:      Dysphagia therapy is not recommended                  PROCEDURE     Consistencies Administered During the Evaluation   Liquids: Thin   Solids:  Pureed, solids      Method of Intake:   Spoon  Self fed    Position:   Upright seated                  RESULTS     Oral Stage: The oral stage of swallowing was within functional limits      Pharyngeal Stage:      No signs of aspiration were noted during this evaluation however, silent aspiration cannot be ruled out at bedside. If silent aspiration is suspected, a Videofluoroscopic Study of Swallowing (MBS) is recommended and requires a physician order. The Speech Language Pathologist (SLP) completed education with the patient regarding results of evaluation. Explained that Speech Pathology intervention is not warranted at this time      CPT code:  79389  bedside swallow eval      [x]The admitting diagnosis and active problem list, as listed below have been reviewed prior to initiation of this evaluation.      ADMITTING DIAGNOSIS: Debility [R53.81]     ACTIVE PROBLEM LIST:   Patient Active Problem List   Diagnosis    Debility    ARLENE (acute kidney injury) ([address]    History of asthma    MAYRA (obstructive sleep apnea)    Essential hypertension    Acute encephalopathy    Colon perforation (HCC)    S/P colostomy ([address]    Hyponatremia    Hypokalemia    Impaired mobility and ADLs    Moderate protein-calorie malnutrition ([address] FRANCO Olea.  Speech Pathology Graduate Extern

## 2020-07-15 NOTE — PROGRESS NOTES
[facility]  Comprehensive Progress Note    Subjective:      Irma Yanes is a 63 y.o. male admitted to inpatient rehabilitation for impairments and activities limitations after prolonged hospital stay with acute encephalopathy and general debility . No acute events overnight. Tolerating therapy evaluations. No cp, sob. Reports some mild nausea, improved from yesterday. Labs reviewed, Na improved, potassium 2.9. UA negative for infection. Seen by wound care, notes reviewed. Evaluated by Nephrology. The patient's medical records have been reviewed. Scheduled Meds:sodium chloride flush, ,   potassium bicarb-citric acid, 40 mEq, BID  potassium bicarb-citric acid, 20 mEq, Once  heparin (porcine), 5,000 Units, Q8H  collagenase, , Q MWF  amLODIPine, 5 mg, Daily  atorvastatin, 40 mg, Daily  chlorthalidone, 25 mg, Daily  citalopram, 10 mg, Daily  pantoprazole, 40 mg, QAM AC  QUEtiapine, 25 mg, BID  budesonide, 0.5 mg, BID    And  Arformoterol Tartrate, 15 mcg, BID      Continuous Infusions:   sodium chloride 50 mL/hr at 07/14/20 2128     PRN Meds:ondansetron, 4 mg, Q6H PRN  oxyCODONE, 5 mg, Q6H PRN  acetaminophen, 650 mg, Q4H PRN  melatonin, 5 mg, Nightly PRN  polyethylene glycol, 17 g, Daily PRN         Objective:      Vitals:    07/14/20 2345 07/15/20 0752 07/15/20 0828 07/15/20 0829   BP:  121/75     Pulse: 90 95     Resp:  18     Temp:  97.7 °F (36.5 °C)     TempSrc:  Temporal     SpO2: 94% 95% 98% 98%   Weight:       Height:         General appearance: alert, appears stated age and cooperative  Head: Normocephalic, without obvious abnormality, atraumatic  Eyes: conjunctivae/corneas clear. PERRL, EOM's intact. Lungs: clear to auscultation bilaterally  Heart: regular rate and rhythm, S1, S2 normal  Abdomen: soft, non-tender, normal bowel sounds.    Extremities: extremities normal, atraumatic, no cyanosis or edema  Musculoskeletal: Range of motion normal and no gross abnormalities. Skin: No rashes. Mid abdominal surgical wound, left lateral chest wound (old chest tube site), L lateral rib cage surgical wound, RUQ colostomy. Dressings dry and intact. Wound vac in place. Neurologic: AAOx4. Speech clear, content appropriate. Follows all commands. SILT throughout. Motor 4-5/5 throughout, no focal deficits. Laboratory:    Lab Results   Component Value Date    WBC 12.0 (H) 07/15/2020    HGB 10.6 (L) 07/15/2020    HCT 32.8 (L) 07/15/2020    MCV 89.9 07/15/2020     (H) 07/15/2020     Lab Results   Component Value Date     07/15/2020    K 2.9 07/15/2020    CL 91 07/15/2020    CO2 28 07/15/2020    BUN 18 07/15/2020    CREATININE 2.1 07/15/2020    GLUCOSE 104 07/15/2020    CALCIUM 9.0 07/15/2020        Lab Results   Component Value Date    COLORU Yellow 07/14/2020    NITRU Negative 07/14/2020    GLUCOSEU Negative 07/14/2020    KETUA Negative 07/14/2020    UROBILINOGEN 0.2 07/14/2020    BILIRUBINUR Negative 07/14/2020         Functional Status:   Physical Therapy:  Bed mobility: SBA  Transfers: SBA-CGA  Ambulation: 50 ft [employer] CGA     Occupational Therapy:  Feeding: Setup/Supervision  Grooming: Setup / Supervision  Bathing: Mod A  UB dressing: Min A  LB dressing: Mod A     Assessment/Plan:       63 y.o. male admitted to inpatient rehabilitation for impairments and activities limitations after prolonged hospital stay with acute encephalopathy and general debility.      -Debility s/p diaphragmatic hernia repair complicated by ARLENE, aspiration pneumonia, recurrent prolonged intubation, bowel perforation, sepsis, colostomy, prolonged hospital stay: Continue Acute rehab evaluations. Monitor wounds, 2 wound vacs, wound care following.  -Acute anoxic encephalopathy: Cognitive status improving significantly. Speech therapy to evaluate  -ARLENE: Nephrology consulted, notes reviewed, patient started on NS 50cc/hr.  Monitor  -Electrolyte imbalance, hyponatremia, hypokalemia: Na improved today, wnl. Replete potassium. Nephrology following.   -HTN: Continue current antihypertensives   -DVT prophylaxis: heparin SQ for now, until mobility improves       Electronically signed by Tristin Mayorga MD on 7/15/2020 at 12:42 PM

## 2020-07-15 NOTE — PROGRESS NOTES
Physical Therapy  Weekly Team Note   Evaluating Therapist: Janna Judge DPT [id number]    NAME: Sonia Farris  ROOM: [id number]  DIAGNOSIS: Debility  PRECAUTIONS: Falls, wound vac   HPI/PROCUDRES: History obtained from outside hospital chart and patient. 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 He recently had a few week history of chest pain and SOB and was found to have a large diaphragmatic hernia. He underwent repair with mesh and lower pulmonary wedge and bullae resection with cryoablation and left chest tube placement on 6/3/2020 at [facility] in [facility]. Post operative course was noted for ARLENE. He developed acute hypoxic respiratory failure on 6/5/2020 and was transferred to SICU requiring intubation. Respiratory cultures grew E. coli and H. Influenza. On 6/9/2020 he was found to have bowel herniation into thoracic cavity with perforation and stool draining from his chest tube. He went back to the OR for open redo diaphragmatic hernia repair and partial colectomy for ischemic transverse colon with stoma placement on 6/10/2020. He then became septic, found to be in septic shock. Per notes he was treated with antibiotics per ID recommendations, weaned off pressors and extubated. He then had an episode of ARF due to mucous plugging requiring intubation and bronchoscopy. He was again extubated on 6/12/2020. He was reintubated on 6/22 due to desaturations and chest xray showed opacification of the left hemithorax. He again underwent bronchoscopy showing mucous plugging. He improved after bronchoscopy and lavage and he was extubated on 6/25. Course noted for acute encephalopathy. He maintained a wound vac to his left thoracotomy site and an abdominal wound vac on the laparotomy site, changed every Tues, Thurs, and Sat, along with packing of the original chest tube site. He completed antibiotic course per ID on 7/1/2020.  He passed MBS on 7/6 and was started on a liquid diet, which was advanced to a soft diet on 7/9. Social:  Pt lives with his wife in a ranch style floor plan with 1 step and no rails to enter. 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  714/20 7/15 comments Short Term Goals Long Term Goals    Bed Mobility  Rolling: SBA  Supine to sit: SBA  Sit to supine: SBA  Scooting: SBA Rolling SBA  Supine to sit SBA  Sit to supine SBA    Due to tubes and lines from B wound vacs Supervision Independent   Transfers Sit to stand: CGA  Stand to sit: CGA  Stand pivot: CGA with [employer] Sit to stand SBA  Stand to sit SBA  Stand pivot CGA  Supervision Independent   Ambulation   20 feet with WW with CGA 75 feet x1 with ww SBA  100 feet with no AD with Supervision >200 feet with no AD with Independent   Wheel Chair Mobility NT NT      Car Transfers NT SBA  Supervision Independent   Stair negotiation: ascended and descended 4 steps with 2 rail with CGA - backward descending 4 steps 2 rails SBA Pt fatigues quickly 4 steps with 1 rail with Supervision 4 steps with 1 rail with Mod Independent   BLE ROM WFL WFL      BLE Strength 4+/5 4+/5      Balance  Sitting: Supervision  Standing: CGA with [employer] Sitting Sup  Standing CGA without A.D      Date Family Teach Completed TBA TBA      Is additional Family Teaching Needed? Y or N Y yes      Hindering Progress Weakness, debility debility      PT recommended ELOS 2-3 weeks 1 week      Team's Discharge Plan  Discharge Friday 7/24/20      Therapist at Team Meeting  Azra Foley, PT, DPT [id number]          Date:  7/15/20  Supporting factors:  Pt overall does well balance wise and strength wise. Barriers to discharge:  B wound vacs, fatigues quickly  Additional comments:  Pt requires rest breaks in between ex and functional activities. Decreased nausea.    DME:  TBD  After Care:  Supervision     Higinio Pereira [id number]

## 2020-07-15 NOTE — PROGRESS NOTES
Occupational Therapy  OCCUPATIONAL THERAPY DAILY NOTE    Date:7/15/2020  Patient Name: Edel Lira  MRN: [id number]  : 1965  Room: [address] [place] [address]-A     Patient Active Problem List   Diagnosis    Debility    [facility]    History of asthma    MAYRA (obstructive sleep apnea)    Essential hypertension    Acute encephalopathy    [facility]ation [facility]    S/P colostomy ([facility]    Hyponatremia    Hypokalemia    Impaired mobility and ADLs       Precautions: falls, abdominal precautions    Functional Assessment:   Date Status AE  Comments   Feeding 20 Set up/Sup     Grooming 20 Set up/Sup     Bathing 20 Mod A     UB Dressing 20 Min A     LB Dressing 20 Mod A   Pt .donned shoes at bed level at S. Homemaking         Functional Transfers / Balance:   Date Status DME  Comments   Sit Balance 7/15/20 Close S     Stand Balance 7/15/20 Min A ww    [] Tub  [] Shower   Transfer       Commode   Transfer 20 Mod A     Functional   Mobility 20 Min A ww    Other:  Bed>WC   7/15/20   CGA   ww      Functional Exercises / Activity:  Ergometer ex's to increase AROM and strength of BUE's. Int- mod  End- 5 mins standing with 2 rest breaks. Green theratube ex's- rows to increase AROM, strength and posture of BUE's. 15 reps x3. Power hand gripper 35# to increase B hand strength. 35 reps     Mr. Karin Andrade with 1# weight X 5 reps to increase BUE forearm, wrist and  strength for independence with functional tasks. Calendar activity with Min  A for problem solving and insight. Pt requires VCs to identify errors however pt able to correct. Block copy pattern with focus on sequencing and problem solving. Pt completes basic patterns with good skill. Moderately complex pattern completed with Max A and gridlines.    Sensory / Neuromuscular Re-Education:      Cognitive Skills:   Status Comments   Problem   Solving good     Memory good     Sequencing good     Safety good Visual Perception:    Education:  Pt educated on safety during functional transfers. [] Family teach completed on:    Pain Level: 0/10 c/o pain. Pt c/o nausea- nursing notified. Additional Notes:       Patient has made good  progress during treatment sessions toward set goals. Therapy emphasis to obtain goals:ADLs, transfers, mobility, strengthening, endurance and LUE ROM. [x] Continue with current OT Plan of care. [] Prepare for Discharge     DISCHARGE RECOMMENDATIONS  Recommended DME:    Post Discharge Care:   []Home Independently  []Home with 24hr Care / Supervision []Home with Partial Supervision []Home with Home Health OT []Home with Out Pt OT []Other: ___   Comments:         Time in Time out Tx Time Breakdown  Variance:   First Session  0915 1000 [x] Individual Tx- 45 mins   [] Concurrent Tx -  [] Co-Tx -   [] Group Tx -   [] Time Missed -     Second Session 2:20 3:05 [x] Individual Tx- 45 Mins  [] Concurrent Tx -  [] Co-Tx -   [] Group Tx -   [] Time Missed -     Third Session    [] Individual Tx-   [] Concurrent Tx -  [] Co-Tx -   [] Group Tx -   [] Time Missed -         Total Tx Time 90 Mins        dEmar Rondonparrish Rising MACHUCA/L [id number]  Lily Garcia MACHUCA/L [id number]  I have read the above note and agree with the documentation.   Chey Song OTR/L [id number]

## 2020-07-16 LAB
ANION GAP SERPL CALCULATED.3IONS-SCNC: 14 MMOL/L (ref 7–16)
ANISOCYTOSIS: ABNORMAL
BASOPHILS ABSOLUTE: 0 E9/L (ref 0–0.2)
BASOPHILS RELATIVE PERCENT: 0.3 % (ref 0–2)
BUN BLDV-MCNC: 15 MG/DL (ref 6–20)
CALCIUM SERPL-MCNC: 8.7 MG/DL (ref 8.6–10.2)
CHLORIDE BLD-SCNC: 89 MMOL/L (ref 98–107)
CO2: 27 MMOL/L (ref 22–29)
CREAT SERPL-MCNC: 1.8 MG/DL (ref 0.7–1.2)
EOSINOPHILS ABSOLUTE: 0.3 E9/L (ref 0.05–0.5)
EOSINOPHILS RELATIVE PERCENT: 1.7 % (ref 0–6)
GFR AFRICAN AMERICAN: 47
GFR NON-AFRICAN AMERICAN: 39 ML/MIN/1.73
GLUCOSE BLD-MCNC: 115 MG/DL (ref 74–99)
HCT VFR BLD CALC: 34.9 % (ref 37–54)
HEMOGLOBIN: 11.1 G/DL (ref 12.5–16.5)
LYMPHOCYTES ABSOLUTE: 3.17 E9/L (ref 1.5–4)
LYMPHOCYTES RELATIVE PERCENT: 18.3 % (ref 20–42)
MCH RBC QN AUTO: 28.8 PG (ref 26–35)
MCHC RBC AUTO-ENTMCNC: 31.8 % (ref 32–34.5)
MCV RBC AUTO: 90.6 FL (ref 80–99.9)
METAMYELOCYTES RELATIVE PERCENT: 0.9 % (ref 0–1)
METER GLUCOSE: 130 MG/DL (ref 74–99)
METER GLUCOSE: 90 MG/DL (ref 74–99)
MONOCYTES ABSOLUTE: 1.23 E9/L (ref 0.1–0.95)
MONOCYTES RELATIVE PERCENT: 7 % (ref 2–12)
NEUTROPHILS ABSOLUTE: 12.85 E9/L (ref 1.8–7.3)
NEUTROPHILS RELATIVE PERCENT: 72.2 % (ref 43–80)
PDW BLD-RTO: 15.9 FL (ref 11.5–15)
PLATELET # BLD: 509 E9/L (ref 130–450)
PMV BLD AUTO: 10.3 FL (ref 7–12)
POIKILOCYTES: ABNORMAL
POLYCHROMASIA: ABNORMAL
POTASSIUM REFLEX MAGNESIUM: 3.8 MMOL/L (ref 3.5–5)
RBC # BLD: 3.85 E12/L (ref 3.8–5.8)
SODIUM BLD-SCNC: 130 MMOL/L (ref 132–146)
TARGET CELLS: ABNORMAL
URINE CULTURE, ROUTINE: NORMAL
WBC # BLD: 17.6 E9/L (ref 4.5–11.5)

## 2020-07-16 PROCEDURE — 6360000002 HC RX W HCPCS: Performed by: PHYSICAL MEDICINE & REHABILITATION

## 2020-07-16 PROCEDURE — 2580000003 HC RX 258: Performed by: INTERNAL MEDICINE

## 2020-07-16 PROCEDURE — 97535 SELF CARE MNGMENT TRAINING: CPT

## 2020-07-16 PROCEDURE — 97530 THERAPEUTIC ACTIVITIES: CPT

## 2020-07-16 PROCEDURE — 94640 AIRWAY INHALATION TREATMENT: CPT

## 2020-07-16 PROCEDURE — 1280000000 HC REHAB R&B

## 2020-07-16 PROCEDURE — 6370000000 HC RX 637 (ALT 250 FOR IP): Performed by: PHYSICAL MEDICINE & REHABILITATION

## 2020-07-16 PROCEDURE — 85025 COMPLETE CBC W/AUTO DIFF WBC: CPT

## 2020-07-16 PROCEDURE — 97110 THERAPEUTIC EXERCISES: CPT

## 2020-07-16 PROCEDURE — 82962 GLUCOSE BLOOD TEST: CPT

## 2020-07-16 PROCEDURE — 6360000002 HC RX W HCPCS: Performed by: INTERNAL MEDICINE

## 2020-07-16 PROCEDURE — 99232 SBSQ HOSP IP/OBS MODERATE 35: CPT | Performed by: PHYSICAL MEDICINE & REHABILITATION

## 2020-07-16 PROCEDURE — 36415 COLL VENOUS BLD VENIPUNCTURE: CPT

## 2020-07-16 PROCEDURE — 80048 BASIC METABOLIC PNL TOTAL CA: CPT

## 2020-07-16 RX ORDER — POTASSIUM CHLORIDE 20 MEQ/1
20 TABLET, EXTENDED RELEASE ORAL
Status: DISCONTINUED | OUTPATIENT
Start: 2020-07-16 | End: 2020-07-24 | Stop reason: HOSPADM

## 2020-07-16 RX ADMIN — HEPARIN SODIUM 5000 UNITS: 10000 INJECTION INTRAVENOUS; SUBCUTANEOUS at 16:26

## 2020-07-16 RX ADMIN — PANTOPRAZOLE SODIUM 40 MG: 40 TABLET, DELAYED RELEASE ORAL at 06:59

## 2020-07-16 RX ADMIN — POTASSIUM CHLORIDE 20 MEQ: 20 TABLET, EXTENDED RELEASE ORAL at 17:17

## 2020-07-16 RX ADMIN — POTASSIUM CHLORIDE 10 MEQ: 10 INJECTION, SOLUTION INTRAVENOUS at 00:49

## 2020-07-16 RX ADMIN — SODIUM CHLORIDE: 9 INJECTION, SOLUTION INTRAVENOUS at 04:15

## 2020-07-16 RX ADMIN — POTASSIUM CHLORIDE 10 MEQ: 10 INJECTION, SOLUTION INTRAVENOUS at 03:12

## 2020-07-16 RX ADMIN — Medication 5 MG: at 21:01

## 2020-07-16 RX ADMIN — CHLORTHALIDONE 25 MG: 25 TABLET ORAL at 08:03

## 2020-07-16 RX ADMIN — BUDESONIDE 500 MCG: 0.5 SUSPENSION RESPIRATORY (INHALATION) at 09:26

## 2020-07-16 RX ADMIN — QUETIAPINE FUMARATE 25 MG: 25 TABLET ORAL at 08:04

## 2020-07-16 RX ADMIN — POTASSIUM CHLORIDE 10 MEQ: 10 INJECTION, SOLUTION INTRAVENOUS at 01:50

## 2020-07-16 RX ADMIN — AMLODIPINE BESYLATE 5 MG: 5 TABLET ORAL at 08:03

## 2020-07-16 RX ADMIN — POTASSIUM CHLORIDE 20 MEQ: 20 TABLET, EXTENDED RELEASE ORAL at 08:11

## 2020-07-16 RX ADMIN — OXYCODONE 5 MG: 5 TABLET ORAL at 21:01

## 2020-07-16 RX ADMIN — ARFORMOTEROL TARTRATE 15 MCG: 15 SOLUTION RESPIRATORY (INHALATION) at 09:26

## 2020-07-16 RX ADMIN — CITALOPRAM 10 MG: 20 TABLET, FILM COATED ORAL at 08:03

## 2020-07-16 RX ADMIN — QUETIAPINE FUMARATE 25 MG: 25 TABLET ORAL at 20:59

## 2020-07-16 RX ADMIN — HEPARIN SODIUM 5000 UNITS: 10000 INJECTION INTRAVENOUS; SUBCUTANEOUS at 08:04

## 2020-07-16 RX ADMIN — POTASSIUM CHLORIDE 20 MEQ: 20 TABLET, EXTENDED RELEASE ORAL at 11:39

## 2020-07-16 RX ADMIN — HEPARIN SODIUM 5000 UNITS: 10000 INJECTION INTRAVENOUS; SUBCUTANEOUS at 01:00

## 2020-07-16 RX ADMIN — SODIUM CHLORIDE: 9 INJECTION, SOLUTION INTRAVENOUS at 18:26

## 2020-07-16 RX ADMIN — ATORVASTATIN CALCIUM 40 MG: 40 TABLET, FILM COATED ORAL at 08:03

## 2020-07-16 ASSESSMENT — PAIN SCALES - GENERAL
PAINLEVEL_OUTOF10: 2
PAINLEVEL_OUTOF10: 4
PAINLEVEL_OUTOF10: 5
PAINLEVEL_OUTOF10: 0
PAINLEVEL_OUTOF10: 0

## 2020-07-16 ASSESSMENT — PAIN DESCRIPTION - ORIENTATION: ORIENTATION: LOWER;MID

## 2020-07-16 ASSESSMENT — PAIN DESCRIPTION - LOCATION: LOCATION: BACK

## 2020-07-16 ASSESSMENT — PAIN DESCRIPTION - PAIN TYPE: TYPE: ACUTE PAIN

## 2020-07-16 ASSESSMENT — PAIN DESCRIPTION - DESCRIPTORS: DESCRIPTORS: ACHING;SORE

## 2020-07-16 ASSESSMENT — PAIN DESCRIPTION - PROGRESSION: CLINICAL_PROGRESSION: NOT CHANGED

## 2020-07-16 ASSESSMENT — PAIN DESCRIPTION - ONSET: ONSET: ON-GOING

## 2020-07-16 ASSESSMENT — PAIN DESCRIPTION - FREQUENCY: FREQUENCY: INTERMITTENT

## 2020-07-16 NOTE — PROGRESS NOTES
[facility]  Comprehensive Progress Note    Subjective:      Carri Hugo is a 63 y.o. male admitted to inpatient rehabilitation for impairments and activities limitations after prolonged hospital stay with acute encephalopathy and general debility . No acute events overnight. No cp, sob, n/v. Afebrile. Tolerating therapy. Occasional orthostasis. No other complaints. Refusing home Bipap. Labs reviewed. The patient's medical records have been reviewed. Scheduled Meds:potassium chloride, 20 mEq, TID WC  heparin (porcine), 5,000 Units, Q8H  collagenase, , Q MWF  amLODIPine, 5 mg, Daily  atorvastatin, 40 mg, Daily  chlorthalidone, 25 mg, Daily  citalopram, 10 mg, Daily  pantoprazole, 40 mg, QAM AC  QUEtiapine, 25 mg, BID  budesonide, 0.5 mg, BID    And  Arformoterol Tartrate, 15 mcg, BID      Continuous Infusions:   sodium chloride 50 mL/hr at 07/16/20 0415     PRN Meds:ondansetron, 4 mg, Q6H PRN  oxyCODONE, 5 mg, Q6H PRN  acetaminophen, 650 mg, Q4H PRN  melatonin, 5 mg, Nightly PRN  polyethylene glycol, 17 g, Daily PRN         Objective:      Vitals:    07/15/20 1324 07/16/20 0100 07/16/20 0803 07/16/20 1132   BP:   (!) 140/73 122/67   Pulse:   121 99   Resp:   16 20   Temp:   99.5 °F (37.5 °C) 98.4 °F (36.9 °C)   TempSrc:   Oral Oral   SpO2:  93% 93%    Weight:       Height: 5' 7\" (1.702 m)        General appearance: alert, appears stated age and cooperative  Head: Normocephalic, without obvious abnormality, atraumatic  Eyes: conjunctivae/corneas clear. PERRL, EOM's intact. Lungs: clear to auscultation bilaterally  Heart: regular rate and rhythm, S1, S2 normal  Abdomen: soft, non-tender, normal bowel sounds. Extremities: extremities normal, atraumatic, no cyanosis or edema  Musculoskeletal: Range of motion normal and no gross abnormalities. Skin: No rashes.   Mid abdominal surgical wound, left lateral chest wound (old chest tube site), L lateral rib cage surgical wound, RUQ colostomy. Dressings dry and intact. Wound vac in place. Neurologic: AAOx4. Speech clear, content appropriate. Follows all commands. SILT throughout. Motor 4-5/5 throughout, no focal deficits. Exam stable    Laboratory:    Lab Results   Component Value Date    WBC 17.6 (H) 07/16/2020    HGB 11.1 (L) 07/16/2020    HCT 34.9 (L) 07/16/2020    MCV 90.6 07/16/2020     (H) 07/16/2020     Lab Results   Component Value Date     07/16/2020    K 3.8 07/16/2020    CL 89 07/16/2020    CO2 27 07/16/2020    BUN 15 07/16/2020    CREATININE 1.8 07/16/2020    GLUCOSE 115 07/16/2020    CALCIUM 8.7 07/16/2020        Lab Results   Component Value Date    COLORU Yellow 07/14/2020    NITRU Negative 07/14/2020    GLUCOSEU Negative 07/14/2020    KETUA Negative 07/14/2020    UROBILINOGEN 0.2 07/14/2020    BILIRUBINUR Negative 07/14/2020         Functional Status:   Physical Therapy:  Bed mobility: SBA  Transfers: SBA-CGA  Ambulation: 75 ft [employer] SBA     Occupational Therapy:  Feeding: Setup/Supervision  Grooming: Setup / Supervision  Bathing: Mod A  UB dressing: Min A  LB dressing: Mod A     Assessment/Plan:       63 y.o. male admitted to inpatient rehabilitation for impairments and activities limitations after prolonged hospital stay with acute encephalopathy and general debility.      -Debility s/p diaphragmatic hernia repair complicated by ARLENE, aspiration pneumonia, recurrent prolonged intubation, bowel perforation, sepsis, colostomy, prolonged hospital stay: Continue Acute rehab evaluations. Monitor wounds, 2 wound vacs, wound care following.  -Acute anoxic encephalopathy: Cognitive status improving significantly. Speech therapy to evaluate  -ARLENE: Nephrology consulted, notes reviewed, patient started on NS 50cc/hr. Monitor  -Electrolyte imbalance, hyponatremia, hypokalemia: Replete potassium.  Nephrology following.  -Persistent leukocytosis: increased wbc today, UA negative, no other signs/symptoms of infection, monitor closely. Recheck in am, further evaluation if not improving or any clinical change. -HTN: Continue current antihypertensives   -Occasional orthostasis, improving. Consider TEDs if needed. -DVT prophylaxis: heparin SQ for now, until mobility improves     Monitor electrolytes. Appreciate Nephrology recommendations.    Patient is refusing his home Bipap at [place]  Team conference tomorrow       Electronically signed by Kang Guthrie MD on 7/16/2020 at 12:57 PM

## 2020-07-16 NOTE — PLAN OF CARE
Problem: Falls - Risk of:  Goal: Will remain free from falls  Description: Will remain free from falls  7/16/2020 1410 by Jeanette Gracia  Outcome: Met This Shift  7/16/2020 0512 by Randi Dubin, RN  Outcome: Met This Shift  Goal: Absence of physical injury  Description: Absence of physical injury  Outcome: Met This Shift     Problem: Pain:  Goal: Pain level will decrease  Description: Pain level will decrease  7/16/2020 1410 by Jeanette Gracia  Outcome: Met This Shift  7/16/2020 0512 by Randi Dubin, RN  Outcome: Met This Shift  Goal: Control of acute pain  Description: Control of acute pain  Outcome: Met This Shift  Goal: Control of chronic pain  Description: Control of chronic pain  Outcome: Met This Shift     Problem: Skin Integrity:  Goal: Will show no infection signs and symptoms  Description: Will show no infection signs and symptoms  Outcome: Met This Shift  Goal: Absence of new skin breakdown  Description: Absence of new skin breakdown  7/16/2020 1410 by Jeanette Gracia  Outcome: Met This Shift  7/16/2020 0512 by Randi Dubin, RN  Outcome: Met This Shift     Problem: Bowel/Gastric:  Goal: Complications related to the disease process, condition or treatment will be avoided or minimized  Outcome: Met This Shift     Problem:  Bowel/Gastric:  Goal: Will be independent with ostomy care  Outcome: Not Met This Shift

## 2020-07-16 NOTE — PROGRESS NOTES
Physical Therapy  Daily Treatment Note   Evaluating Therapist: Sheeba Francis DPT [id number]    NAME: Richardson Guadarrama  ROOM: [id number]  DIAGNOSIS: Debility  PRECAUTIONS: Falls, wound vac   HPI/PROCUDRES: History obtained from outside hospital chart and patient. 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 He recently had a few week history of chest pain and SOB and was found to have a large diaphragmatic hernia. He underwent repair with mesh and lower pulmonary wedge and bullae resection with cryoablation and left chest tube placement on 6/3/2020 at [facility] in [place]. Post operative course was noted for ARLENE. He developed acute hypoxic respiratory failure on 6/5/2020 and was transferred to SICU requiring intubation. Respiratory cultures grew E. coli and H. Influenza. On 6/9/2020 he was found to have bowel herniation into thoracic cavity with perforation and stool draining from his chest tube. He went back to the OR for open redo diaphragmatic hernia repair and partial colectomy for ischemic transverse colon with stoma placement on 6/10/2020. He then became septic, found to be in septic shock. Per notes he was treated with antibiotics per ID recommendations, weaned off pressors and extubated. He then had an episode of ARF due to mucous plugging requiring intubation and bronchoscopy. He was again extubated on 6/12/2020. He was reintubated on 6/22 due to desaturations and chest xray showed opacification of the left hemithorax. He again underwent bronchoscopy showing mucous plugging. He improved after bronchoscopy and lavage and he was extubated on 6/25. Course noted for acute encephalopathy. He maintained a wound vac to his left thoracotomy site and an abdominal wound vac on the laparotomy site, changed every Tues, Thurs, and Sat, along with packing of the original chest tube site. He completed antibiotic course per ID on 7/1/2020.  He passed MBS on 7/6 and was started on a liquid diet, which was advanced to a soft diet on 7/9. Social:  Pt lives with his wife in a ranch style floor plan with 1 step and no rails to enter. 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  714/20 A. M.   P.M. Short Term Goals Long Term Goals    Was pt agreeable to Eval/treatment? yes yes yes     Does pt have pain?  No c/o pain but reported nausea C/o all over pain and weakness today Pt c/o nausea     Bed Mobility  Rolling: SBA  Supine to sit: SBA  Sit to supine: SBA  Scooting: SBA Rolling Sup  Sit to supine Sup  Scooting Sup Rolling Sup  Sit to supine Sup  Scooting Sup Supervision Independent   Transfers Sit to stand: CGA  Stand to sit: CGA  Stand pivot: CGA with [employer] Sit to stand Sup  Stand to sit Sup  Stand pivot SBA due to wound vac lines and IV line Sit to stand Sup  Stand to sit Sup  Stand pivot SBA Supervision Independent   Ambulation   20 feet with WW with CGA 45 feet x2 reps with ww SBA 15 feet x2 reps CGA without A.D.   75 feet x2 reps with ww  feet with no AD with Supervision >200 feet with no AD with Independent   Walking 10 feet on uneven surface 10 feet with WW with CGA N/T N/T 10 feet with no AD with Supervision 10 feet with no AD with Independent   Wheel Chair Mobility NT  N/T     Car Transfers NT SBA N/T Supervision Independent   Stair negotiation: ascended and descended 4 steps with 2 rail with CGA - backward descending N/T 12 steps 2 rails SBA 4 steps with 1 rail with Supervision 4 steps with 1 rail with Mod Independent   Curb Step:   ascended and descended 7.5 inch step with [employer] and CGA N/T N/T 7.5 inch step with no AD and Supervision 7.5 inch step with no AD and Independent   Picking up object off the floor Picked up a cone with CGA N/T N/T Will  an object with Supervision Will  an object with Independent   BLE ROM WFL       BLE Strength 4+/5       Balance  Sitting: Supervision  Standing: CGA with [employer] Sitting Ind

## 2020-07-16 NOTE — PROGRESS NOTES
Occupational Therapy  OCCUPATIONAL THERAPY DAILY NOTE    Date:2020  Patient Name: Clement Araujo  MRN: [id number]  : 1965  Room: [address] [place] [address]A     Patient Active Problem List   Diagnosis    Debility    [facility]    History of asthma    MAYRA (obstructive sleep apnea)    Essential hypertension    Acute encephalopathy    [facility] perforation ([facility]    [facility]    Hyponatremia    Hypokalemia    Impaired mobility and ADLs    Moderate protein-calorie malnutrition ([facility]       Precautions: falls, abdominal precautions    Functional Assessment:   Date Status AE  Comments   Feeding 20 Set up/Sup     Grooming 20 Set up/Sup  Washed face. Bathing 20 SBA  UB and LB bathing completed seated and standing at edge of bed. Pt completed lower BLEs and feet in supine. Seated edge of bed pt washed upper BLEs and UB. SBA to wash usman area and buttocks. UB Dressing 20 Set up  Don/doff pullover shirt. LB Dressing 20 Min A  Don/doff shorts/pants, socks and shoes. Assistance to don socks onto sock aide d/t tight socks. Assistance to tie shoes. Homemaking         Functional Transfers / Balance:   Date Status DME  Comments   Sit Balance 7/15/20 Close S     Stand Balance 20 SBA ww    [] Tub  [] Shower   Transfer       Commode   Transfer 20 Min A     Functional   Mobility 20 Min A ww    Other:  Bed>WC   7/15/20   CGA   ww      Functional Exercises / Activity:  Mod resistive mary bar X 25 reps on 4 planes to increase BUE forearm, wrist and  strength for independence with functional tasks. Copy pattern pegboard activity with focus on sequencing and problem solving. Pt completes with good skill and accuracy.    Sensory / Neuromuscular Re-Education:      Cognitive Skills:   Status Comments   Problem   Solving good     Memory good     Sequencing good     Safety good      Visual Perception:    Education:  Pt educated on safety during functional

## 2020-07-16 NOTE — PROGRESS NOTES
[facility]  Nephrology Attending Progress Note  Ronnie Osei. Kimi Mcknight MD        SUBJECTIVE: feeling good, no complaints per se  -Received 6 doses of IV KCl 10mEq yesterday, K: 07/15: 2.9--> 07/16: 3.8    PROBLEM LIST:    Patient Active Problem List   Diagnosis    Debility    [facility]    History of asthma    MAYRA (obstructive sleep apnea)    Essential hypertension    Acute encephalopathy    Colon perforation (HCC)    S/P colostomy ([facility]    Hyponatremia    Hypokalemia    Impaired mobility and ADLs    Moderate protein-calorie malnutrition ([facility]        PAST MEDICAL HISTORY:    No past medical history on file.     DIET:    DIET DENTAL SOFT; Carb Control: 5 carb choices (75 gms)/meal  Dietary Nutrition Supplements: Wound Healing Oral Supplement  Dietary Nutrition Supplements: Diabetic Oral Supplement     PHYSICAL EXAM:     Patient Vitals for the past 24 hrs:   BP Temp Temp src Pulse Resp SpO2 Height Weight   07/16/20 0803 (!) 140/73 99.5 °F (37.5 °C) Oral 121 16 93 % -- --   07/16/20 0100 -- -- -- -- -- 93 % -- --   07/15/20 1324 -- -- -- -- -- -- 5' 7\" (1.702 m) --   07/15/20 1315 -- -- -- -- -- -- -- 213 lb (96.6 kg)   @      Intake/Output Summary (Last 24 hours) at 7/16/2020 1101  Last data filed at 7/16/2020 0658  Gross per 24 hour   Intake 1400 ml   Output 1400 ml   Net 0 ml         Wt Readings from Last 3 Encounters:   07/15/20 213 lb (96.6 kg)       Constitutional:  Pt is in no acute distress  Head: normocephalic, atraumatic  Neck: no JVD  Cardiovascular: regular rate and rhythm, no murmurs, gallops, or rubs  Respiratory:  No rales, rhochi, or wheezes  Gastrointestinal:  Soft, nontender, nondistended, bowel sounds x 4  Ext: no pedal edema  Skin: dry, no rash  Neuro: awake, alert    MEDS (scheduled):    potassium chloride  20 mEq Oral TID WC    heparin (porcine)  5,000 Units Subcutaneous Q8H    collagenase   Topical Q MWF    amLODIPine  5 mg Oral Daily    atorvastatin  40 mg Oral Daily    chlorthalidone  25 mg Oral Daily    citalopram  10 mg Oral Daily    pantoprazole  40 mg Oral QAM AC    QUEtiapine  25 mg Oral BID    budesonide  0.5 mg Nebulization BID    And    Arformoterol Tartrate  15 mcg Nebulization BID       MEDS (infusions):   sodium chloride 50 mL/hr at 07/16/20 0415       MEDS (prn):  ondansetron, oxyCODONE, acetaminophen, melatonin, polyethylene glycol    DATA:    Recent Labs     07/14/20  0625 07/15/20  0439 07/16/20  0754   WBC 12.7* 12.0* 17.6*   HGB 10.7* 10.6* 11.1*   HCT 33.4* 32.8* 34.9*   MCV 89.1 89.9 90.6   * 512* 509*     Recent Labs     07/14/20  0625 07/14/20  1901 07/15/20  0439 07/16/20  0754   *  --  132 130*   K 3.0*  --  2.9* 3.8   CL 90*  --  91* 89*   CO2 24  --  28 27   BUN 18  --  18 15   CREATININE 2.1*  --  2.1* 1.8*   LABGLOM 33  --  33 39   GLUCOSE 110*  --  104* 115*   CALCIUM 9.1  --  9.0 8.7   MG 1.9  --  1.9  --    PHOS  --  3.2  --   --        No results found for: LABALBU  No results found for: TSH    Iron Studies  No results found for: IRON, TIBC, FERRITIN  No results found for: [id number]  No results found for: FOLATE    No results found for: VITD25  PTH   Date Value Ref Range Status   07/14/2020 29 15 - 65 pg/mL Final       No components found for: URIC    Lab Results   Component Value Date    COLORU Yellow 07/14/2020    NITRU Negative 07/14/2020    GLUCOSEU Negative 07/14/2020    KETUA Negative 07/14/2020    UROBILINOGEN 0.2 07/14/2020    BILIRUBINUR Negative 07/14/2020       No results found for: LIPIDPAN      IMPRESSION/RECOMMENDATIONS:      1. Acute kidney Injury 2/2 hypotension 2/2 post-op complications including sepsis/septic shock, high output from stoma:  -Patient had a complicated post-operative period--septic shock can lead to hypotension--hemodynamically mediated acute kidney injury-->rise in serum creatinine.   -BP: 120s/60s today  -Unsure what his baseline is, paper documents in the patient's file mention BUN: 41, creatinine: 1.71 on 07/07/20  -urine electrolytes: ur. chloride: 53, ur. Creat: 77, ur osm; 303, ur K: 45.2, ur. Na: 32, FeNa: 0.7% (<1%)--prerenal etiology  -BUN: 15 Creatinine: 1.8, better today  -NS 50cc/hr   -monitor I/Os  -BMP daily     2. Electrolyte imbalance -hyponatremia, hypokalemia  -Na today: 130  -Please continue NS 50cc. -Received IV KCl, 10mEq over 1 hour, 6 doses: K has been corrected from 2.9-->3.8  -20mEq Potassium-bicarb-citric acid tablets: to be continued     3. HTN  -Norvasc 5mg, Chlorthalidone 25mg to be continued     4. MAYRA   -on CPAP    Nancy Sprague.  Shivam Allen MD    Pt seen and examined agree  With above  Jose Rodriguez

## 2020-07-17 LAB
ANION GAP SERPL CALCULATED.3IONS-SCNC: 12 MMOL/L (ref 7–16)
ANISOCYTOSIS: ABNORMAL
BASOPHILS ABSOLUTE: 0.11 E9/L (ref 0–0.2)
BASOPHILS RELATIVE PERCENT: 0.9 % (ref 0–2)
BUN BLDV-MCNC: 15 MG/DL (ref 6–20)
CALCIUM SERPL-MCNC: 8.6 MG/DL (ref 8.6–10.2)
CHLORIDE BLD-SCNC: 96 MMOL/L (ref 98–107)
CO2: 27 MMOL/L (ref 22–29)
CREAT SERPL-MCNC: 1.8 MG/DL (ref 0.7–1.2)
EOSINOPHILS ABSOLUTE: 0.52 E9/L (ref 0.05–0.5)
EOSINOPHILS RELATIVE PERCENT: 4.3 % (ref 0–6)
GFR AFRICAN AMERICAN: 47
GFR NON-AFRICAN AMERICAN: 39 ML/MIN/1.73
GLUCOSE BLD-MCNC: 91 MG/DL (ref 74–99)
HCT VFR BLD CALC: 31.7 % (ref 37–54)
HEMOGLOBIN: 10.1 G/DL (ref 12.5–16.5)
LYMPHOCYTES ABSOLUTE: 3.05 E9/L (ref 1.5–4)
LYMPHOCYTES RELATIVE PERCENT: 25.2 % (ref 20–42)
MAGNESIUM: 1.8 MG/DL (ref 1.6–2.6)
MCH RBC QN AUTO: 29 PG (ref 26–35)
MCHC RBC AUTO-ENTMCNC: 31.9 % (ref 32–34.5)
MCV RBC AUTO: 91.1 FL (ref 80–99.9)
METER GLUCOSE: 98 MG/DL (ref 74–99)
MONOCYTES ABSOLUTE: 1.22 E9/L (ref 0.1–0.95)
MONOCYTES RELATIVE PERCENT: 10.4 % (ref 2–12)
NEUTROPHILS ABSOLUTE: 7.2 E9/L (ref 1.8–7.3)
NEUTROPHILS RELATIVE PERCENT: 59.1 % (ref 43–80)
OVALOCYTES: ABNORMAL
PDW BLD-RTO: 15.9 FL (ref 11.5–15)
PLATELET # BLD: 465 E9/L (ref 130–450)
PMV BLD AUTO: 10.3 FL (ref 7–12)
POIKILOCYTES: ABNORMAL
POLYCHROMASIA: ABNORMAL
POTASSIUM REFLEX MAGNESIUM: 3.5 MMOL/L (ref 3.5–5)
RBC # BLD: 3.48 E12/L (ref 3.8–5.8)
SODIUM BLD-SCNC: 135 MMOL/L (ref 132–146)
TARGET CELLS: ABNORMAL
WBC # BLD: 12.2 E9/L (ref 4.5–11.5)

## 2020-07-17 PROCEDURE — 36415 COLL VENOUS BLD VENIPUNCTURE: CPT

## 2020-07-17 PROCEDURE — 6360000002 HC RX W HCPCS: Performed by: PHYSICAL MEDICINE & REHABILITATION

## 2020-07-17 PROCEDURE — 97530 THERAPEUTIC ACTIVITIES: CPT

## 2020-07-17 PROCEDURE — 85025 COMPLETE CBC W/AUTO DIFF WBC: CPT

## 2020-07-17 PROCEDURE — 82962 GLUCOSE BLOOD TEST: CPT

## 2020-07-17 PROCEDURE — 83735 ASSAY OF MAGNESIUM: CPT

## 2020-07-17 PROCEDURE — 97535 SELF CARE MNGMENT TRAINING: CPT

## 2020-07-17 PROCEDURE — 1280000000 HC REHAB R&B

## 2020-07-17 PROCEDURE — 6370000000 HC RX 637 (ALT 250 FOR IP): Performed by: PHYSICAL MEDICINE & REHABILITATION

## 2020-07-17 PROCEDURE — 99232 SBSQ HOSP IP/OBS MODERATE 35: CPT | Performed by: PHYSICAL MEDICINE & REHABILITATION

## 2020-07-17 PROCEDURE — 97110 THERAPEUTIC EXERCISES: CPT

## 2020-07-17 PROCEDURE — 80048 BASIC METABOLIC PNL TOTAL CA: CPT

## 2020-07-17 PROCEDURE — 97606 NEG PRS WND THER DME>50 SQCM: CPT

## 2020-07-17 RX ADMIN — QUETIAPINE FUMARATE 25 MG: 25 TABLET ORAL at 20:46

## 2020-07-17 RX ADMIN — OXYCODONE 5 MG: 5 TABLET ORAL at 05:29

## 2020-07-17 RX ADMIN — POTASSIUM CHLORIDE 20 MEQ: 20 TABLET, EXTENDED RELEASE ORAL at 11:51

## 2020-07-17 RX ADMIN — COLLAGENASE SANTYL: 250 OINTMENT TOPICAL at 09:39

## 2020-07-17 RX ADMIN — POTASSIUM CHLORIDE 20 MEQ: 20 TABLET, EXTENDED RELEASE ORAL at 16:00

## 2020-07-17 RX ADMIN — HEPARIN SODIUM 5000 UNITS: 10000 INJECTION INTRAVENOUS; SUBCUTANEOUS at 16:00

## 2020-07-17 RX ADMIN — PANTOPRAZOLE SODIUM 40 MG: 40 TABLET, DELAYED RELEASE ORAL at 05:29

## 2020-07-17 RX ADMIN — Medication 5 MG: at 20:46

## 2020-07-17 RX ADMIN — POTASSIUM CHLORIDE 20 MEQ: 20 TABLET, EXTENDED RELEASE ORAL at 09:41

## 2020-07-17 RX ADMIN — AMLODIPINE BESYLATE 5 MG: 5 TABLET ORAL at 09:41

## 2020-07-17 RX ADMIN — OXYCODONE 5 MG: 5 TABLET ORAL at 20:46

## 2020-07-17 RX ADMIN — HEPARIN SODIUM 5000 UNITS: 10000 INJECTION INTRAVENOUS; SUBCUTANEOUS at 00:28

## 2020-07-17 RX ADMIN — CHLORTHALIDONE 25 MG: 25 TABLET ORAL at 09:41

## 2020-07-17 RX ADMIN — ATORVASTATIN CALCIUM 40 MG: 40 TABLET, FILM COATED ORAL at 09:41

## 2020-07-17 RX ADMIN — QUETIAPINE FUMARATE 25 MG: 25 TABLET ORAL at 09:41

## 2020-07-17 RX ADMIN — CITALOPRAM 10 MG: 20 TABLET, FILM COATED ORAL at 09:41

## 2020-07-17 RX ADMIN — HEPARIN SODIUM 5000 UNITS: 10000 INJECTION INTRAVENOUS; SUBCUTANEOUS at 09:40

## 2020-07-17 RX ADMIN — ACETAMINOPHEN 650 MG: 325 TABLET ORAL at 09:40

## 2020-07-17 ASSESSMENT — PAIN DESCRIPTION - PAIN TYPE
TYPE: ACUTE PAIN
TYPE: ACUTE PAIN

## 2020-07-17 ASSESSMENT — PAIN DESCRIPTION - PROGRESSION: CLINICAL_PROGRESSION: NOT CHANGED

## 2020-07-17 ASSESSMENT — PAIN DESCRIPTION - FREQUENCY: FREQUENCY: INTERMITTENT

## 2020-07-17 ASSESSMENT — PAIN SCALES - GENERAL
PAINLEVEL_OUTOF10: 6
PAINLEVEL_OUTOF10: 4
PAINLEVEL_OUTOF10: 0
PAINLEVEL_OUTOF10: 6
PAINLEVEL_OUTOF10: 6
PAINLEVEL_OUTOF10: 0
PAINLEVEL_OUTOF10: 4

## 2020-07-17 ASSESSMENT — PAIN DESCRIPTION - ORIENTATION
ORIENTATION: LOWER;MID
ORIENTATION: LOWER;MID

## 2020-07-17 ASSESSMENT — PAIN DESCRIPTION - LOCATION
LOCATION: BACK
LOCATION: BACK

## 2020-07-17 ASSESSMENT — PAIN - FUNCTIONAL ASSESSMENT: PAIN_FUNCTIONAL_ASSESSMENT: PREVENTS OR INTERFERES SOME ACTIVE ACTIVITIES AND ADLS

## 2020-07-17 ASSESSMENT — PAIN DESCRIPTION - DESCRIPTORS: DESCRIPTORS: SORE;ACHING;DISCOMFORT

## 2020-07-17 ASSESSMENT — PAIN DESCRIPTION - ONSET: ONSET: ON-GOING

## 2020-07-17 NOTE — PROGRESS NOTES
[facility]  Nephrology Attending Progress Note  Anish Fernandes. Crissy Virk MD        SUBJECTIVE: Feeling better today, eating and drinking well      PROBLEM LIST:    Patient Active Problem List   Diagnosis    Debility    [facility]    History of asthma    MAYRA (obstructive sleep apnea)    Essential hypertension    Acute encephalopathy    Colon perforation (HCC)    S[facility]P colostomy ([facility]    Hyponatremia    Hypokalemia    Impaired mobility and ADLs    Moderate protein-calorie malnutrition ([facility]        PAST MEDICAL HISTORY:    No past medical history on file.     DIET:    DIET DENTAL SOFT; Carb Control: 5 carb choices (75 gms)/meal  Dietary Nutrition Supplements: Wound Healing Oral Supplement  Dietary Nutrition Supplements: Diabetic Oral Supplement     PHYSICAL EXAM:     Patient Vitals for the past 24 hrs:   BP Temp Temp src Pulse Resp SpO2   07/17/20 0930 121/73 98.9 °F (37.2 °C) Temporal 95 18 93 %   07/17/20 0028 -- -- -- -- -- 92 %   07/16/20 1132 122/67 98.4 °F (36.9 °C) Oral 99 20 --   @      Intake/Output Summary (Last 24 hours) at 7/17/2020 1113  Last data filed at 7/17/2020 0730  Gross per 24 hour   Intake 2086 ml   Output 1925 ml   Net 161 ml         Wt Readings from Last 3 Encounters:   07/15/20 213 lb (96.6 kg)       Constitutional:  Pt is in no acute distress  Head: normocephalic, atraumatic  Neck: no JVD  Cardiovascular: regular rate and rhythm, no murmurs, gallops, or rubs  Respiratory:  No rales, rhochi, or wheezes  Gastrointestinal:  Soft, nontender, nondistended, bowel sounds x 4  Ext:  Skin: dry, no rash  Neuro:     MEDS (scheduled):    potassium chloride  20 mEq Oral TID WC    heparin (porcine)  5,000 Units Subcutaneous Q8H    collagenase   Topical Q MWF    amLODIPine  5 mg Oral Daily    atorvastatin  40 mg Oral Daily    chlorthalidone  25 mg Oral Daily    citalopram  10 mg Oral Daily    pantoprazole  40 mg Oral QAM AC    QUEtiapine  25 mg Oral BID    budesonide 0.5 mg Nebulization BID    And    Arformoterol Tartrate  15 mcg Nebulization BID       MEDS (infusions):   sodium chloride 50 mL/hr at 07/17/20 0648       MEDS (prn):  ondansetron, oxyCODONE, acetaminophen, melatonin, polyethylene glycol    DATA:    Recent Labs     07/15/20  0439 07/16/20  0754 07/17/20  0524   WBC 12.0* 17.6* 12.2*   HGB 10.6* 11.1* 10.1*   HCT 32.8* 34.9* 31.7*   MCV 89.9 90.6 91.1   * 509* 465*     Recent Labs     07/14/20  1901 07/15/20  0439 07/16/20  0754 07/17/20  0524   NA  --  132 130* 135   K  --  2.9* 3.8 3.5   CL  --  91* 89* 96*   CO2  --  28 27 27   BUN  --  18 15 15   CREATININE  --  2.1* 1.8* 1.8*   LABGLOM  --  33 39 39   GLUCOSE  --  104* 115* 91   CALCIUM  --  9.0 8.7 8.6   MG  --  1.9  --  1.8   PHOS 3.2  --   --   --        No results found for: LABALBU  No results found for: TSH    Iron Studies  No results found for: IRON, TIBC, FERRITIN  No results found for: [id number]  No results found for: FOLATE    No results found for: VITD25  PTH   Date Value Ref Range Status   07/14/2020 29 15 - 65 pg/mL Final       No components found for: URIC    Lab Results   Component Value Date    COLORU Yellow 07/14/2020    NITRU Negative 07/14/2020    GLUCOSEU Negative 07/14/2020    KETUA Negative 07/14/2020    UROBILINOGEN 0.2 07/14/2020    BILIRUBINUR Negative 07/14/2020       No results found for: LIPIDPAN      IMPRESSION/RECOMMENDATIONS:       1. Acute kidney Injury 2/2 hypotension 2/2 post-op complications including sepsis/septic shock, high output from stoma:  -Patient had a complicated post-operative period--septic shock can lead to hypotension--hemodynamically mediated acute kidney injury-->rise in serum creatinine. -BP: 120s/70s today  -Unsure what his baseline is, paper documents in the patient's file mention BUN: 41, creatinine: 1.71 on 07/07/20  -urine electrolytes: ur. chloride: 53, ur. Creat: 77, ur osm; 303, ur K: 45.2, ur.  Na: 32, FeNa: 0.7% (<1%)--prerenal etiology  -BUN: 15 Creatinine: 1.8 same as yesterday  -s. Albumin: to assess nutritional status  -continue NS 50cc/hr   -monitor I/Os  -BMP daily     2. Electrolyte imbalance -hyponatremia, hypokalemia  -Na today: 135, K:3.5  -Please continue NS 50cc. -Received IV KCl, 10mEq over 1 hour, 6 doses: K has been corrected from 2.9-->3.8  -20mEq Potassium-bicarb-citric acid tablets: to be continued     3. HTN  -Norvasc 5mg, Chlorthalidone 25mg to be continued     4. MAYRA   -on CPAP    Corina Laird  PGY-1, Internal Medicine        Sadia Drilling.  Frances Reynoso MD

## 2020-07-17 NOTE — PROGRESS NOTES
[facility]  Comprehensive Progress Note    Subjective:      Richardson Guadarrama is a 63 y.o. male admitted to inpatient rehabilitation for impairments and activities limitations after prolonged hospital stay with acute encephalopathy and general debility . No acute events overnight. No cp, sob, n/v. Tolerating therapy. No other complaints. Labs reviewed. The patient's medical records have been reviewed. Scheduled Meds:potassium chloride, 20 mEq, TID WC  heparin (porcine), 5,000 Units, Q8H  collagenase, , Q MWF  amLODIPine, 5 mg, Daily  atorvastatin, 40 mg, Daily  chlorthalidone, 25 mg, Daily  citalopram, 10 mg, Daily  pantoprazole, 40 mg, QAM AC  QUEtiapine, 25 mg, BID  budesonide, 0.5 mg, BID    And  Arformoterol Tartrate, 15 mcg, BID      Continuous Infusions:   sodium chloride 50 mL/hr at 07/17/20 0648     PRN Meds:ondansetron, 4 mg, Q6H PRN  oxyCODONE, 5 mg, Q6H PRN  acetaminophen, 650 mg, Q4H PRN  melatonin, 5 mg, Nightly PRN  polyethylene glycol, 17 g, Daily PRN         Objective:      Vitals:    07/16/20 0803 07/16/20 1132 07/17/20 0028 07/17/20 0930   BP: (!) 140/73 122/67  121/73   Pulse: 121 99  95   Resp: 16 20  18   Temp: 99.5 °F (37.5 °C) 98.4 °F (36.9 °C)  98.9 °F (37.2 °C)   TempSrc: Oral Oral  Temporal   SpO2: 93%  92% 93%   Weight:       Height:         General appearance: alert, appears stated age and cooperative  Head: Normocephalic, without obvious abnormality, atraumatic  Eyes: conjunctivae/corneas clear. PERRL, EOM's intact. Lungs: no wheezes or rales, decreased breath sounds at the left base  Heart: regular rate and rhythm, S1, S2 normal  Abdomen: soft, non-tender, normal bowel sounds. Extremities: extremities normal, atraumatic, no cyanosis or edema  Musculoskeletal: Range of motion normal and no gross abnormalities. Skin: No rashes.   Mid abdominal surgical wound, left lateral chest wound (old chest tube site), L lateral rib cage surgical wound, RUQ colostomy. Dressings dry and intact. Wound vac in place. Neurologic: AAOx4. Speech clear, content appropriate. Follows all commands. SILT throughout. Motor 4-5/5 throughout, no focal deficits. Laboratory:    Lab Results   Component Value Date    WBC 12.2 (H) 07/17/2020    HGB 10.1 (L) 07/17/2020    HCT 31.7 (L) 07/17/2020    MCV 91.1 07/17/2020     (H) 07/17/2020     Lab Results   Component Value Date     07/17/2020    K 3.5 07/17/2020    CL 96 07/17/2020    CO2 27 07/17/2020    BUN 15 07/17/2020    CREATININE 1.8 07/17/2020    GLUCOSE 91 07/17/2020    CALCIUM 8.6 07/17/2020        Lab Results   Component Value Date    COLORU Yellow 07/14/2020    NITRU Negative 07/14/2020    GLUCOSEU Negative 07/14/2020    KETUA Negative 07/14/2020    UROBILINOGEN 0.2 07/14/2020    BILIRUBINUR Negative 07/14/2020         Functional Status:   Physical Therapy:  Bed mobility: SBA  Transfers: SBA-CGA  Ambulation: 75 ft [employer] SBA     Occupational Therapy:  Feeding: Setup/Supervision  Grooming: Setup / Supervision  Bathing: SBA  UB dressing: Setup  LB dressing: Min A     Assessment/Plan:       63 y.o. male admitted to inpatient rehabilitation for impairments and activities limitations after prolonged hospital stay with acute encephalopathy and general debility.      -Debility s/p diaphragmatic hernia repair complicated by ARLENE, aspiration pneumonia, recurrent prolonged intubation, bowel perforation, sepsis, colostomy, prolonged hospital stay: Continue Acute rehab program. Monitor wounds, 2 wound vacs, wound care following.  -Acute anoxic encephalopathy: Cognitive status improving significantly. Speech therapy evaluated. -ARLENE: Nephrology following patient, on NS 50cc/hr. Cr stable. -Electrolyte imbalance, hyponatremia, hypokalemia: Replete potassium. On IV NS. Nephrology following. Na and K+ wnl today.    -WBC decreased to 12.2 today  -HTN: Continue current antihypertensives   -DVT prophylaxis: heparin SQ for now, until mobility improves     Team conference today      Electronically signed by Cal Waite MD on 7/17/2020 at 10:32 AM

## 2020-07-17 NOTE — PATIENT CARE CONFERENCE
[address] [place] [address]  ACUTE REHABILITATION  TEAM CONFERENCE NOTE/PATIENT PLAN OF CARE    Date: 2020  Admission date: 2020  Patient Name: Monique Sheehan        MRN: [id number]    : 1965  (59 y.o.)  Gender: male   Rehab diagnosis/surgery with date: Anoxic Encephalopathy  Impairment Group Code:  2.1      MEDICAL/FUNCTIONAL HISTORY/STATUS:  recent hernia repair for congenital diaphragmatic hernia  on 6/3/20, multiple post op complications including bowel perforation, acute kidney injury, respiratory failure.   Now has a colostomy, open abdominal wound and open left chest wound at site of prior chest tube with wound VAC on each wound    Consultations/Labs/X-rays: Nephrology saw for acute kidney injury-creatinine 1.8 today      MEDICATION UPDATE:  on Klor-Con for low potassium      NURSING FIMS:    Bowel:   Current level: colostomy    Bladder:   Current level: continent    Toilet Hygiene:   Current level : supervision  Short term Toilet hygiene goal: supervision  Long term toilet hygiene goal:  supervision    Skin integrity: abdominal incision and left chest wound-wound VAC to each site, dressing changes Mon-Wed and Friday-wound care nurse here this am for dressing changes    Pain: Roxicodone 5 mg as needed    NUTRITION    Diet  dental  soft  Liquid consistency   thin    SOCIAL INFORMATION:  Lives with: spouse  Prior community services:  none  Home Architecture:  ranch, 1 entry, no rails  Prior Level of function:  independent, 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  DME:  none    FAMILY / [address]:  safety and self care are ongoing    PHYSICAL THERAPY    Bed mobility:   Current level: standby assist  Short term bed mobility goal: supervision  Long term bed mobility goal: independent    Chair/bed transfers:  Current level: standby assist-Contact guard assist  Short term Chair/bed transfers goal: supervision  Long term Chair/bed transfers goal: Modified independent      Ambulation: Current level: 76 ft wheeled walker standby assist  Short term ambulation goal: 100 at supervision  Long term ambulation goal: 200 ft at Modified independent      Car transfers:   Current level: standby assist  Short term car transfers goal: supervision  Long term car transfers goal:Modified independent    Stairs:   Current level : 4 with 2 rails   Short term stairs goal: supervision  Long term stairs goal: 4 at Modified independent    Lower Extremity Strength Issues:  4+/5 bilaterally    Other comments: bipap at night ordered but refusing      OCCUPATIONAL THERAPY:      Tub/shower:   Current level: NA      Feeding:  Current level: supervision  Short term feeding goal: Modified independent  Long term feeding goal: Modified independent    Grooming:   Current level: supervision at sink   Short term grooming goal: Modified independent  Long term grooming goal: Modified independent    Bathing:  Current level: sponge, standby assist  Short term bathing goal: met  Long term bathing goal: Modified independent    Homemaking:   Current level: NA    Upper body dressing:  Current level: Modified independent  Short term upper body dressing goal: Modified independent  Long term upper body dressing goal: Modified independent    Lower body dressing:  Current level: min assist with adaptive equipment  Short term lower body dressing goal: supervision  Long term lower body dressing goal: Modified independent    Toilet transfer:   Current level: min assist  Short term toilet transfer goal: supervision  Long term toilet transfer goal: Modified independent      SPEECH THERAPY    Comprehension:   Current level: independent    Expression:   Current level: independent    Problem solving:   Current level: Modified independent-supervision  Short term problem solving goal: Modified independent  Long term problem solving goal: Modified independent    Memory:  Current level: Modified independent-supervision  Short term memory goal: Modified

## 2020-07-17 NOTE — PROGRESS NOTES
Occupational Therapy  OCCUPATIONAL THERAPY DAILY NOTE    Date:2020  Patient Name: Sonia Farris  MRN: [id number]  : 1965  Room: [address] [place] [address]A     Patient Active Problem List   Diagnosis    Debility    [facility]    History of asthma    MAYRA (obstructive sleep apnea)    Essential hypertension    Acute encephalopathy    [facility] ([facility]    S/P colostomy ([facility]    Hyponatremia    Hypokalemia    Impaired mobility and ADLs    Moderate protein-calorie malnutrition (HCC)       Precautions: falls, abdominal precautions    Functional Assessment:   Date Status AE  Comments   Feeding 20 Set up/Sup     Grooming 20 Set up/Sup     Bathing 20 SBA     UB Dressing 20 Set up     LB Dressing 20 Min A     Homemaking         Functional Transfers / Balance:   Date Status DME  Comments   Sit Balance 7/15/20 Close S     Stand Balance 20 SBA ww    [] Tub  [x] Shower   Transfer 20 CGA ww 4'' step over with ww. Commode   Transfer 20 SBA ww  grabb ar 3 in 1 commode transfer with SBA and ww. Standard commode transfer with grab bar completed with SBA for safety and to guide d/t wound vac tubing. Functional   Mobility 20 SBA ww    Other:  Bed>WC   7/15/20   CGA   ww      Functional Exercises / Activity:  3 1/2# dowel venkatesh 2-3 X 10 shoulder flexion, scapular protraction/retraction to increase endurance and BUE strength for independence with functional tasks and transfers. Calendar activity completed to increase problem solving, LTM recall and orientation to month and year. Pt completes with good skill and accuracy     24 pc puzzle with focus on sequencing, problem solving and STM recall. Good skill noted.    Sensory / Neuromuscular Re-Education:      Cognitive Skills:   Status Comments   Problem   Solving good     Memory good     Sequencing good     Safety good      Visual Perception:    Education:  Pt educated on safety during functional transfers. [] Family teach completed on:    Pain Level: 0/10 c/o pain. Pt c/o nausea- nursing notified. Additional Notes:       Patient has made good  progress during treatment sessions toward set goals. Therapy emphasis to obtain goals:ADLs, transfers, mobility, strengthening, endurance and LUE ROM. [x] Continue with current OT Plan of care. [] Prepare for Discharge     DISCHARGE RECOMMENDATIONS  Recommended DME:    Post Discharge Care:   []Home Independently  []Home with 24hr Care / Supervision []Home with Partial Supervision []Home with Home Health OT []Home with Out Pt OT []Other: ___   Comments:         Time in Time out Tx Time Breakdown  Variance:   First Session  10:45 11:45 [x] Individual Tx- 45 mins   [] Concurrent Tx -  [] Co-Tx -   [] Group Tx -   [] Time Missed -     Second Session 2:30 3:15 [x] Individual Tx-45 Mins  [] Concurrent Tx -  [] Co-Tx -   [] Group Tx -   [] Time Missed -     Third Session    [] Individual Tx-   [] Concurrent Tx -  [] Co-Tx -   [] Group Tx -   [] Time Missed -         Total Tx Mariah Overcast MACHUCA/L [id number]  I have read the above note and agree with the documentation.   Dora Yoon OTR/L [id number]

## 2020-07-17 NOTE — CARE COORDINATION
Per team meeting:  D/c 7/24. Updated patient and wife. Patient goals range from Mod I to I. Hindering is fatigues at times. Patient has new colostomy, wound vac and extensive abdominal incisions. Dr. Carmelita Matthew to be consulted for support. Patient is very happy with d/c date. DME - to be checked on 7/23. Aftercare - Nursing/PT/OT. Provided all choices. She has decided on [facility]. Referral to [facility]. Zelalem Martinez would like to see patient wounds with Yolie Khan from wound care. Leo Galeazzi - charge nurse is aware. FT - 7/24 am.      The Plan for Transition of Care is related to the following treatment goals: [place] [id number]. The Patient and/or patient representative wife was provided with a choice of provider and agrees   with the discharge plan. [x] Yes [] No    Freedom of choice list was provided with basic dialogue that supports the patient's individualized plan of care/goals, treatment preferences and shares the quality data associated with the providers.  [x] Yes [] No    Marta Ferraro

## 2020-07-17 NOTE — PROGRESS NOTES
liquid diet, which was advanced to a soft diet on 7/9. Social:  Pt lives with his wife in a ranch style floor plan with 1 step and no rails to enter. 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 yes yes yes     Does pt have pain? No c/o pain but reported nausea No c/o pain No c/o pain     Bed Mobility  Rolling: SBA  Supine to sit: SBA  Sit to supine: SBA  Scooting: SBA Supervision all aspects NT Supervision Independent   Transfers Sit to stand: CGA  Stand to sit: CGA  Stand pivot: CGA with WW Sit to stand: Independent  Stand to sit: Independent  Stand pivot: SBA with [address] Sit to stand: Independent  Stand to sit: Independent  Stand pivot: SBA with [address] Supervision Independent   Ambulation   20 feet with [address] with  feet with [address]  feet with [address]  feet with no AD with Supervision >200 feet with no AD with Independent   Walking 10 feet on uneven surface 10 feet with WW with CGA NT NT 10 feet with no AD with Supervision 10 feet with no AD with Independent   Wheel Chair Mobility NT NT NT     Car Transfers NT NT NT Supervision Independent   Stair negotiation: ascended and descended 4 steps with 2 rail with CGA - backward descending NT 16 steps 2 rails SBA 4 steps with 1 rail with Supervision 4 steps with 1 rail with Mod Independent   Curb Step:   ascended and descended 7.5 inch step with [address] and CGA NT NT 7.5 inch step with no AD and Supervision 7.5 inch step with no AD and Independent   Picking up object off the floor Picked up a cone with CGA NT NT Will  an object with Supervision Will  an object with Independent   BLE ROM [facility] WF      BLE Strength 4+/5 4+/5      Balance  Sitting: Supervision  Standing: CGA with [address] Sitting: Independent  Standing: SBA with [address]      Date Family Teach Completed TBA TBA      Is additional Family Teaching Needed?   Y or N Y yes      Hindering Progress Weakness, debility Weakness, debility      PT recommended ELOS 2-3 weeks       Team's Discharge Plan        Therapist at Team Meeting          Therapeutic Exercise:   AM:   - Functional mobility as noted above in chart  - Sit<>stand 2x10 in parallel bars  PM:   - Functional mobility as noted above in chart  - Sit<>stand x10 reps from wheelchair to [employer]  - Forward step up downs onto 6 inch step with 4lb ankle weights on each leg x10/side    Additional Comments: Pt was late to AM PT session due to wound care and not being ready for PT time. Pt did not report any significant adverse signs/symptoms during sessions. Pt has progress in strength and endurance resulting in improved functional mobility levels. Pt continuing to use WW at this time to assist with BLE weakness. Future PT sessions should continue to progress pt's strength, endurance, and function. Patient/family education  Pt/family educated on continued benefits of PT, safety with functional mobility    Patient/family response to education:   Pt/family verbalized understanding Pt/family demonstrated skill Pt/family requires further education in this area   yes yes yes     AM  Time in: 1030  Time out: 1045  PM  Time in: 1345  Time out: 1430    Pt is making good progress toward established Physical Therapy goals. Continue with physical therapy current plan of care.     Tariq Mcguire DPT  [id number]

## 2020-07-17 NOTE — FLOWSHEET NOTE
07/17/20 0028   Oxygen Therapy   SpO2 92 %   Pulse Oximeter Device Mode Intermittent   Pulse Oximeter Device Location Left   O2 Device None (Room air)  (Continues to decline home Bi-pap.)

## 2020-07-17 NOTE — FLOWSHEET NOTE
Inpatient Wound Care(follow up) 5502a    Admit Date: 7/13/2020  6:38 PM    Reason for consult:  Wound vac management and new colostomy    Findings:     07/17/20 0920   Wound 07/13/20 Abdomen Mid;Proximal   Date First Assessed/Time First Assessed: 07/13/20 2130   Present on Hospital Admission: Yes  Primary Wound Type: Surgical Type  Location: Abdomen  Wound Location Orientation: Mid;Proximal   Wound Non-Healing Surgical   Dressing Changed Changed/New   Dressing/Treatment Pharmaceutical agent (see MAR); Vacuum dressing   Wound Cleansed Rinsed/Irrigated with saline   Wound Length (cm) 2.4 cm   Wound Width (cm) 1.6 cm   Wound Depth (cm) 1.6 cm   Wound Surface Area (cm^2) 3.84 cm^2   Change in Wound Size % (l*w) 38.06   Wound Volume (cm^3) 6.14 cm^3   Wound Healing % 38   Wound Assessment Slough; Red   Drainage Amount Scant   Drainage Description Serosanguinous   Odor None   Alejandra-wound Assessment Intact   Wound 07/13/20 Chest Left;Lateral   Date First Assessed/Time First Assessed: 07/13/20 2130   Present on Hospital Admission: Yes  Location: Chest  Wound Location Orientation: Left;Lateral   Dressing Changed Changed/New   Dressing/Treatment Vacuum dressing   Wound Cleansed Rinsed/Irrigated with saline   Wound Length (cm) 0.6 cm   Wound Width (cm) 0.6 cm   Wound Depth (cm) 3 cm   Wound Surface Area (cm^2) 0.36 cm^2   Change in Wound Size % (l*w) 25   Wound Volume (cm^3) 1.08 cm^3   Wound Healing % 25   Drainage Amount Scant   Drainage Description Serosanguinous   Odor None   Alejandra-wound Assessment Intact   Wound 07/13/20 Left;Lateral rib cage   Date First Assessed/Time First Assessed: 07/13/20 2130   Present on Hospital Admission: Yes  Wound Location Orientation: Left;Lateral  Wound Description (Comments): rib cage   Wound Non-Healing Surgical   Dressing Changed Changed/New   Dressing/Treatment Vacuum dressing   Wound Cleansed Rinsed/Irrigated with saline   Wound Length (cm) 2 cm   Wound Width (cm) 14 cm   Wound Depth (cm) 5 cm   Wound Surface Area (cm^2) 28 cm^2   Change in Wound Size % (l*w) 0   Wound Volume (cm^3) 140 cm^3   Wound Healing % 0   Wound Assessment Red   Drainage Amount Moderate   Drainage Description Serosanguinous   Odor None   Alejandra-wound Assessment Intact   Wound 07/14/20 Abdomen Distal;Mid   Date First Assessed/Time First Assessed: 07/14/20 1305   Present on Hospital Admission: Yes  Primary Wound Type: Surgical Type  Location: Abdomen  Wound Location Orientation: Distal;Mid   Wound Non-Healing Surgical   Dressing Changed Changed/New   Dressing/Treatment Pharmaceutical agent (see MAR); Vacuum dressing   Wound Cleansed Rinsed/Irrigated with saline   Wound Length (cm) 9 cm   Wound Width (cm) 5.8 cm   Wound Depth (cm) 3.6 cm   Wound Surface Area (cm^2) 52.2 cm^2   Change in Wound Size % (l*w) 15.81   Wound Volume (cm^3) 187.92 cm^3   Wound Healing % 46   Wound Assessment Slough; Red   Drainage Amount Scant   Drainage Description Serosanguinous   Odor None   Alejandra-wound Assessment   (necrotic wound edges)   Negative Pressure Wound Therapy Left;Lateral   Placement Date/Time: 07/14/20 1305   Pre-existing: No  Inserted by: rib cage area  Wound Location Orientation: Left;Lateral   $ Standard NPWT >50 sq cm PER TX $ Yes   Dressing Type Black foam   Number of pieces used 3   Cycle Continuous   Target Pressure (mmHg) 125   Canister changed? No   Dressing Change Due 07/21/20   Negative Pressure Wound Therapy Abdomen Mid;Proximal;Distal   Placement Date/Time: 07/14/20 1305   Pre-existing: No  Inserted by: times 2 sites  Location: Abdomen  Wound Location Orientation: Mid;Proximal;Distal   $ Standard NPWT >50 sq cm PER TX $ Yes   Dressing Type Black foam   Number of pieces used 6   Cycle Continuous   Target Pressure (mmHg) 125   Canister changed?  No   Dressing Change Due 07/21/20   Colostomy RUQ Transverse   Placement Date: 06/09/20   Pre-existing: Yes  Location: RUQ  Colostomy Type: Transverse   Stomal Appliance 1 piece   Flange Size (inches)   (3 inch)   Stoma  Assessment Protrudes; Moist;Red;Swelling   Mucocutaneous Junction Separation   Treatment Bag change;Stoma powder  (skin care)   Stool Appearance Soft   Stool Color Brown   Stool Amount Medium   Wound dimensions to abdomen smaller    Plan:  Wound vac changes next week Tuesday and Friday    Alexander You 7/17/2020 9:53 AM

## 2020-07-18 LAB
ALBUMIN SERPL-MCNC: 2.5 G/DL (ref 3.5–5.2)
ANION GAP SERPL CALCULATED.3IONS-SCNC: 14 MMOL/L (ref 7–16)
BUN BLDV-MCNC: 11 MG/DL (ref 6–20)
CALCIUM SERPL-MCNC: 8.7 MG/DL (ref 8.6–10.2)
CHLORIDE BLD-SCNC: 95 MMOL/L (ref 98–107)
CO2: 26 MMOL/L (ref 22–29)
CREAT SERPL-MCNC: 1.7 MG/DL (ref 0.7–1.2)
GFR AFRICAN AMERICAN: 51
GFR NON-AFRICAN AMERICAN: 42 ML/MIN/1.73
GLUCOSE BLD-MCNC: 88 MG/DL (ref 74–99)
POTASSIUM REFLEX MAGNESIUM: 3.8 MMOL/L (ref 3.5–5)
SODIUM BLD-SCNC: 135 MMOL/L (ref 132–146)

## 2020-07-18 PROCEDURE — 6370000000 HC RX 637 (ALT 250 FOR IP): Performed by: PHYSICAL MEDICINE & REHABILITATION

## 2020-07-18 PROCEDURE — 97110 THERAPEUTIC EXERCISES: CPT

## 2020-07-18 PROCEDURE — 80048 BASIC METABOLIC PNL TOTAL CA: CPT

## 2020-07-18 PROCEDURE — 36415 COLL VENOUS BLD VENIPUNCTURE: CPT

## 2020-07-18 PROCEDURE — 2580000003 HC RX 258: Performed by: INTERNAL MEDICINE

## 2020-07-18 PROCEDURE — 1280000000 HC REHAB R&B

## 2020-07-18 PROCEDURE — 6360000002 HC RX W HCPCS: Performed by: PHYSICAL MEDICINE & REHABILITATION

## 2020-07-18 PROCEDURE — 82040 ASSAY OF SERUM ALBUMIN: CPT

## 2020-07-18 RX ADMIN — POTASSIUM CHLORIDE 20 MEQ: 20 TABLET, EXTENDED RELEASE ORAL at 16:17

## 2020-07-18 RX ADMIN — ATORVASTATIN CALCIUM 40 MG: 40 TABLET, FILM COATED ORAL at 08:34

## 2020-07-18 RX ADMIN — AMLODIPINE BESYLATE 5 MG: 5 TABLET ORAL at 08:34

## 2020-07-18 RX ADMIN — HEPARIN SODIUM 5000 UNITS: 10000 INJECTION INTRAVENOUS; SUBCUTANEOUS at 00:57

## 2020-07-18 RX ADMIN — Medication 5 MG: at 20:29

## 2020-07-18 RX ADMIN — POTASSIUM CHLORIDE 20 MEQ: 20 TABLET, EXTENDED RELEASE ORAL at 08:34

## 2020-07-18 RX ADMIN — HEPARIN SODIUM 5000 UNITS: 10000 INJECTION INTRAVENOUS; SUBCUTANEOUS at 08:34

## 2020-07-18 RX ADMIN — CHLORTHALIDONE 25 MG: 25 TABLET ORAL at 08:34

## 2020-07-18 RX ADMIN — CITALOPRAM 10 MG: 20 TABLET, FILM COATED ORAL at 08:34

## 2020-07-18 RX ADMIN — PANTOPRAZOLE SODIUM 40 MG: 40 TABLET, DELAYED RELEASE ORAL at 07:02

## 2020-07-18 RX ADMIN — QUETIAPINE FUMARATE 25 MG: 25 TABLET ORAL at 08:33

## 2020-07-18 RX ADMIN — QUETIAPINE FUMARATE 25 MG: 25 TABLET ORAL at 20:29

## 2020-07-18 RX ADMIN — ACETAMINOPHEN 650 MG: 325 TABLET ORAL at 16:18

## 2020-07-18 RX ADMIN — OXYCODONE 5 MG: 5 TABLET ORAL at 20:28

## 2020-07-18 RX ADMIN — SODIUM CHLORIDE: 9 INJECTION, SOLUTION INTRAVENOUS at 01:00

## 2020-07-18 RX ADMIN — POTASSIUM CHLORIDE 20 MEQ: 20 TABLET, EXTENDED RELEASE ORAL at 11:41

## 2020-07-18 RX ADMIN — HEPARIN SODIUM 5000 UNITS: 10000 INJECTION INTRAVENOUS; SUBCUTANEOUS at 16:17

## 2020-07-18 ASSESSMENT — PAIN SCALES - GENERAL
PAINLEVEL_OUTOF10: 0
PAINLEVEL_OUTOF10: 2
PAINLEVEL_OUTOF10: 6
PAINLEVEL_OUTOF10: 0
PAINLEVEL_OUTOF10: 4
PAINLEVEL_OUTOF10: 0

## 2020-07-18 NOTE — PROGRESS NOTES
[facility]  Progress Note    GENERAL:   Covering for Dr Jes Zarco. 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 On 6/3, he underwent repair with mesh and lower pulmonary wedge and bullous resection with cryoablation at [facility]. Post-op he developed respiratory failure with +ve respiratory culture for H. Influenza and E.Coli. On 6/9 he had bowel herniation, and stool draining from the chest tube. On 6/10, he was taken back to the OR for Partial Colectomy, for ischemic trasverse colon with Stoma placed. Post op developed septic shock and ARF, he was mechanically ventilated, extubated on 6/20, re-intubated 6/22, and had several bronchoscopies for mucous plugs. Extubated on 6/25, and transferred to the ARU on 7/14. Lab reviewed, leucocytosis improving. Albumin noted. Tolerating Dental soft Carb Controlled diet, with nutrition supplement. Plan is for patient to go home on 7/24.     VITALS:   Vitals:    07/17/20 0028 07/17/20 0930 07/18/20 0100 07/18/20 0830   BP:  121/73  126/64   Pulse:  95  96   Resp:  18  16   Temp:  98.9 °F (37.2 °C)  97.3 °F (36.3 °C)   TempSrc:  Temporal  Temporal   SpO2: 92% 93% 94% 93%   Weight:       Height:           Scheduled Meds:   potassium chloride  20 mEq Oral TID WC    heparin (porcine)  5,000 Units Subcutaneous Q8H    collagenase   Topical Q MWF    amLODIPine  5 mg Oral Daily    atorvastatin  40 mg Oral Daily    chlorthalidone  25 mg Oral Daily    citalopram  10 mg Oral Daily    pantoprazole  40 mg Oral QAM AC    QUEtiapine  25 mg Oral BID    budesonide  0.5 mg Nebulization BID    And    Arformoterol Tartrate  15 mcg Nebulization BID     Continuous Infusions:   sodium chloride 50 mL/hr at 07/18/20 0650     PRN Meds:.ondansetron, oxyCODONE, acetaminophen, melatonin, polyethylene glycol      LABS:  CBC with Differential:    Lab Results Component Value Date    WBC 12.2 07/17/2020    RBC 3.48 07/17/2020    HGB 10.1 07/17/2020    HCT 31.7 07/17/2020     07/17/2020    MCV 91.1 07/17/2020    MCH 29.0 07/17/2020    MCHC 31.9 07/17/2020    RDW 15.9 07/17/2020    METASPCT 0.9 07/16/2020    LYMPHOPCT 25.2 07/17/2020    MONOPCT 10.4 07/17/2020    MYELOPCT 0.9 07/15/2020    BASOPCT 0.9 07/17/2020    MONOSABS 1.22 07/17/2020    LYMPHSABS 3.05 07/17/2020    EOSABS 0.52 07/17/2020    BASOSABS 0.11 07/17/2020     BMP:    Lab Results   Component Value Date     07/18/2020    K 3.8 07/18/2020    CL 95 07/18/2020    CO2 26 07/18/2020    BUN 11 07/18/2020    LABALBU 2.5 07/18/2020    CREATININE 1.7 07/18/2020    CALCIUM 8.7 07/18/2020    GFRAA 51 07/18/2020    LABGLOM 42 07/18/2020    GLUCOSE 88 07/18/2020     Albumin:    Lab Results   Component Value Date    LABALBU 2.5 07/18/2020       FUNCTIONAL STATUS:     Cognition:   WFL. Speech:  WNL. ADLs and Self Care:  MIN to SBA. Bed Mobility:  SUP. Transfers:   SBA to Independent. Gait:     200' with [employer] with SBA. Stairs:    12' with 2 rails with SBA.    ROM:        PLAN:    1.) Recheck labs.   2.) Continue rehab Program.

## 2020-07-18 NOTE — PROGRESS NOTES
Progress Note  7/18/2020 10:58 AM  Subjective:   Admit Date: 7/13/2020  PCP: No primary care provider on file. Interval History: Patient examined , in no distress     Diet: DIET DENTAL SOFT; Carb Control: 5 carb choices (75 gms)/meal  Dietary Nutrition Supplements: Wound Healing Oral Supplement  Dietary Nutrition Supplements: Diabetic Oral Supplement    Data:   Scheduled Meds:   potassium chloride  20 mEq Oral TID WC    heparin (porcine)  5,000 Units Subcutaneous Q8H    collagenase   Topical Q MWF    amLODIPine  5 mg Oral Daily    atorvastatin  40 mg Oral Daily    chlorthalidone  25 mg Oral Daily    citalopram  10 mg Oral Daily    pantoprazole  40 mg Oral QAM AC    QUEtiapine  25 mg Oral BID    budesonide  0.5 mg Nebulization BID    And    Arformoterol Tartrate  15 mcg Nebulization BID     Continuous Infusions:   sodium chloride 50 mL/hr at 07/18/20 0650     PRN Meds:ondansetron, oxyCODONE, acetaminophen, melatonin, polyethylene glycol  I/O last 3 completed shifts: In: 1940 [P.O.:1500; I.V.:440]  Out: 1925 [Urine:1675; Stool:250]  No intake/output data recorded. Intake/Output Summary (Last 24 hours) at 7/18/2020 1058  Last data filed at 7/18/2020 0700  Gross per 24 hour   Intake 1640 ml   Output 1925 ml   Net -285 ml     CBC:   Recent Labs     07/16/20  0754 07/17/20  0524   WBC 17.6* 12.2*   HGB 11.1* 10.1*   * 465*     BMP:    Recent Labs     07/16/20  0754 07/17/20  0524 07/18/20  0536   * 135 135   K 3.8 3.5 3.8   CL 89* 96* 95*   CO2 27 27 26   BUN 15 15 11   CREATININE 1.8* 1.8* 1.7*   GLUCOSE 115* 91 88     Hepatic: No results for input(s): AST, ALT, ALB, BILITOT, ALKPHOS in the last 72 hours. Troponin: No results for input(s): TROPONINI in the last 72 hours. BNP: No results for input(s): BNP in the last 72 hours. Lipids: No results for input(s): CHOL, HDL in the last 72 hours.     Invalid input(s): LDLCALCU  ABGs: No results found for: PHART, PO2ART, NNY4DWR  INR: No results for input(s): INR in the last 72 hours. -----------------------------------------------------------------  RAD: No results found. Objective:   Vitals: /64   Pulse 96   Temp 97.3 °F (36.3 °C) (Temporal)   Resp 16   Ht 5' 7\" (1.702 m)   Wt 213 lb (96.6 kg) Comment: bed scale  SpO2 93%   BMI 33.36 kg/m²   General appearance: appears stated age   Skin:  No rashes or lesions  HEENT: Head: Normocephalic, no lesions, without obvious abnormality. Neck: no adenopathy, no carotid bruit, no JVD, supple, symmetrical, trachea midline and thyroid not enlarged, symmetric, no tenderness/mass/nodules  Lungs: clear to auscultation bilaterally  Heart: regular rate and rhythm, S1, S2 normal, no murmur, click, rub or gallop  Abdomen: soft, non-tender; bowel sounds normal; no masses,  no organomegaly  Extremities: extremities normal, atraumatic, no cyanosis or edema  Neurologic: Mental status: Alert, oriented, thought content appropriate    Assessment:   Patient Active Problem List:     Debility     ARLENE (acute kidney injury) ([facility] [address]     History of asthma     MAYRA (obstructive sleep apnea)     Essential hypertension     Acute encephalopathy     Colon perforation (HCC)     S/P colostomy (HCC)     Hyponatremia     Hypokalemia     Impaired mobility and ADLs     Moderate protein-calorie malnutrition ([facility] [address]    Plan:     IMPRESSION/RECOMMENDATIONS:       1. Acute kidney Injury in the setting of  hypotension , post-op complications including sepsis/septic shock, high output from stoma:  -Patient had a complicated post-operative period--septic shock can lead to hypotension--hemodynamically mediated acute kidney injury-->rise in serum creatinine. -BP: 120s/70s today  -Unsure what his baseline is, paper documents in the patient's file mention BUN: 41, creatinine: 1.71 on 07/07/20  -urine electrolytes: ur. chloride: 53, ur. Creat: 77, ur osm; 303, ur K: 45.2, ur.  Na: 32, FeNa: 0.7% (<1%)--prerenal etiology  -BUN: 15 Creatinine: 1.7 same as yesterday  -s. Albumin: to assess nutritional status  -continue NS 50cc/hr   -monitor I/Os  -BMP daily     2. Electrolyte imbalance -hyponatremia, hypokalemia -- Resolved   -Na today: 135, K:3.8  -Please continue NS 50cc. -Received IV KCl, 10mEq over 1 hour, 6 doses: K has been corrected from 2.9-->3.8  -20mEq Potassium-bicarb-citric acid tablets: to be continued     3. HTN  -Norvasc 5mg, Chlorthalidone 25mg to be continued     4.  MAYRA   -on CPAP    Kennedy Rivera

## 2020-07-18 NOTE — PROGRESS NOTES
Occupational Therapy  OCCUPATIONAL THERAPY DAILY NOTE    Date:2020  Patient Name: Dennis Knight  MRN: [id number]  : 1965  Room: [address] [place] [address]-A   Diagnosis: debility    Precautions: falls, abdominal precautions    Functional Assessment:   Date Status AE  Comments   Feeding 20 Set up/Sup     Grooming 20 Set up/Sup     Bathing 20 SBA     UB Dressing 20 Set up     LB Dressing 20 Min A     Homemaking         Functional Transfers / Balance:   Date Status DME  Comments   Sit Balance 7/15/20 Close S     Stand Balance 20 SBA ww    [] Tub  [x] Shower   Transfer 20 CGA ww     Commode   Transfer 20 SBA ww  grabb ar    Functional   Mobility 20 SBA ww    Other:  Bed>WC   7/15/20   CGA   ww      Functional Exercises / Activity:    Pt seated completed B UE ex's focusing on UE strength/endurance to increase independence with transfers/mobility and ADL tasks;   2# dumb bell in all planes 3 x 10 reps;   Min resistant can do bar 2 x 15 reps 3 planes; Pt engaged in [employer] pulley's 1 x 5 minutes focusing on UE shoulder AROM to increase independence with ADL tasks; Sensory / Neuromuscular Re-Education:      Cognitive Skills:   Status Comments   Problem   Solving good     Memory good     Sequencing good     Safety good      Visual Perception:    Education:  Pt educated on safety during functional transfers. [] Family teach completed on:    Pain Level: 0/10 c/o pain. Pt c/o nausea- nursing notified. Additional Notes:       Patient has made good  progress during treatment sessions toward set goals. Therapy emphasis to obtain goals:ADLs, transfers, mobility, strengthening, endurance and LUE ROM. [x] Continue with current OT Plan of care.   [] Prepare for Discharge     DISCHARGE RECOMMENDATIONS  Recommended DME:    Post Discharge Care:   []Home Independently  []Home with 24hr Care / Supervision []Home with Partial Supervision []Home with Home Health OT []Home with Out Pt OT []Other: ___   Comments:         Time in Time out Tx Time Breakdown  Variance:   First Session  1519 0826 [x] Individual Tx- 30 min  [] Concurrent Tx -  [] Co-Tx -   [] Group Tx -   [] Time Missed -     Second Session   [] Individual Tx-   [] Concurrent Tx -  [] Co-Tx -   [] Group Tx -   [] Time Missed -     Third Session    [] Individual Tx-   [] Concurrent Tx -  [] Co-Tx -   [] Group Tx -   [] Time Missed -         Total Tx Time 30 min        Nevin Camilo   MACHUCA/KRISTIN [facility], [address] [place] [address], OTR/L [id number]

## 2020-07-19 LAB
ALBUMIN SERPL-MCNC: 2.6 G/DL (ref 3.5–5.2)
ALP BLD-CCNC: 194 U/L (ref 40–129)
ALT SERPL-CCNC: 59 U/L (ref 0–40)
ANION GAP SERPL CALCULATED.3IONS-SCNC: 13 MMOL/L (ref 7–16)
AST SERPL-CCNC: 60 U/L (ref 0–39)
BILIRUB SERPL-MCNC: 0.5 MG/DL (ref 0–1.2)
BUN BLDV-MCNC: 12 MG/DL (ref 6–20)
CALCIUM SERPL-MCNC: 9.2 MG/DL (ref 8.6–10.2)
CHLORIDE BLD-SCNC: 95 MMOL/L (ref 98–107)
CO2: 27 MMOL/L (ref 22–29)
CREAT SERPL-MCNC: 1.7 MG/DL (ref 0.7–1.2)
GFR AFRICAN AMERICAN: 51
GFR NON-AFRICAN AMERICAN: 42 ML/MIN/1.73
GLUCOSE BLD-MCNC: 95 MG/DL (ref 74–99)
POTASSIUM REFLEX MAGNESIUM: 4.1 MMOL/L (ref 3.5–5)
POTASSIUM SERPL-SCNC: 4.1 MMOL/L (ref 3.5–5)
SODIUM BLD-SCNC: 135 MMOL/L (ref 132–146)
TOTAL PROTEIN: 7.6 G/DL (ref 6.4–8.3)

## 2020-07-19 PROCEDURE — 97530 THERAPEUTIC ACTIVITIES: CPT

## 2020-07-19 PROCEDURE — 6370000000 HC RX 637 (ALT 250 FOR IP): Performed by: PHYSICAL MEDICINE & REHABILITATION

## 2020-07-19 PROCEDURE — 36415 COLL VENOUS BLD VENIPUNCTURE: CPT

## 2020-07-19 PROCEDURE — 6360000002 HC RX W HCPCS: Performed by: PHYSICAL MEDICINE & REHABILITATION

## 2020-07-19 PROCEDURE — 1280000000 HC REHAB R&B

## 2020-07-19 PROCEDURE — 94640 AIRWAY INHALATION TREATMENT: CPT

## 2020-07-19 PROCEDURE — 80048 BASIC METABOLIC PNL TOTAL CA: CPT

## 2020-07-19 PROCEDURE — 80053 COMPREHEN METABOLIC PANEL: CPT

## 2020-07-19 RX ADMIN — PANTOPRAZOLE SODIUM 40 MG: 40 TABLET, DELAYED RELEASE ORAL at 06:40

## 2020-07-19 RX ADMIN — HEPARIN SODIUM 5000 UNITS: 10000 INJECTION INTRAVENOUS; SUBCUTANEOUS at 08:08

## 2020-07-19 RX ADMIN — POTASSIUM CHLORIDE 20 MEQ: 20 TABLET, EXTENDED RELEASE ORAL at 11:20

## 2020-07-19 RX ADMIN — AMLODIPINE BESYLATE 5 MG: 5 TABLET ORAL at 08:09

## 2020-07-19 RX ADMIN — ATORVASTATIN CALCIUM 40 MG: 40 TABLET, FILM COATED ORAL at 08:09

## 2020-07-19 RX ADMIN — QUETIAPINE FUMARATE 25 MG: 25 TABLET ORAL at 08:09

## 2020-07-19 RX ADMIN — OXYCODONE 5 MG: 5 TABLET ORAL at 21:53

## 2020-07-19 RX ADMIN — ACETAMINOPHEN 650 MG: 325 TABLET ORAL at 08:09

## 2020-07-19 RX ADMIN — POTASSIUM CHLORIDE 20 MEQ: 20 TABLET, EXTENDED RELEASE ORAL at 08:08

## 2020-07-19 RX ADMIN — ACETAMINOPHEN 650 MG: 325 TABLET ORAL at 16:26

## 2020-07-19 RX ADMIN — CHLORTHALIDONE 25 MG: 25 TABLET ORAL at 08:09

## 2020-07-19 RX ADMIN — Medication 5 MG: at 21:53

## 2020-07-19 RX ADMIN — HEPARIN SODIUM 5000 UNITS: 10000 INJECTION INTRAVENOUS; SUBCUTANEOUS at 00:07

## 2020-07-19 RX ADMIN — POTASSIUM CHLORIDE 20 MEQ: 20 TABLET, EXTENDED RELEASE ORAL at 17:00

## 2020-07-19 RX ADMIN — QUETIAPINE FUMARATE 25 MG: 25 TABLET ORAL at 21:50

## 2020-07-19 RX ADMIN — ARFORMOTEROL TARTRATE 15 MCG: 15 SOLUTION RESPIRATORY (INHALATION) at 19:40

## 2020-07-19 RX ADMIN — ARFORMOTEROL TARTRATE 15 MCG: 15 SOLUTION RESPIRATORY (INHALATION) at 10:09

## 2020-07-19 RX ADMIN — BUDESONIDE 500 MCG: 0.5 SUSPENSION RESPIRATORY (INHALATION) at 19:39

## 2020-07-19 RX ADMIN — BUDESONIDE 500 MCG: 0.5 SUSPENSION RESPIRATORY (INHALATION) at 10:09

## 2020-07-19 RX ADMIN — HEPARIN SODIUM 5000 UNITS: 10000 INJECTION INTRAVENOUS; SUBCUTANEOUS at 16:27

## 2020-07-19 RX ADMIN — CITALOPRAM 10 MG: 20 TABLET, FILM COATED ORAL at 08:09

## 2020-07-19 SDOH — HEALTH STABILITY: MENTAL HEALTH: HOW OFTEN DO YOU HAVE A DRINK CONTAINING ALCOHOL?: MONTHLY OR LESS

## 2020-07-19 SDOH — HEALTH STABILITY: MENTAL HEALTH: HOW MANY STANDARD DRINKS CONTAINING ALCOHOL DO YOU HAVE ON A TYPICAL DAY?: 1 OR 2

## 2020-07-19 ASSESSMENT — PAIN SCALES - GENERAL
PAINLEVEL_OUTOF10: 5
PAINLEVEL_OUTOF10: 4
PAINLEVEL_OUTOF10: 5
PAINLEVEL_OUTOF10: 3
PAINLEVEL_OUTOF10: 6
PAINLEVEL_OUTOF10: 0
PAINLEVEL_OUTOF10: 0

## 2020-07-19 ASSESSMENT — PAIN DESCRIPTION - ONSET: ONSET: ON-GOING

## 2020-07-19 ASSESSMENT — PAIN DESCRIPTION - FREQUENCY: FREQUENCY: INTERMITTENT

## 2020-07-19 ASSESSMENT — PAIN DESCRIPTION - PAIN TYPE: TYPE: ACUTE PAIN

## 2020-07-19 ASSESSMENT — PAIN DESCRIPTION - ORIENTATION
ORIENTATION: MID
ORIENTATION: MID

## 2020-07-19 ASSESSMENT — PAIN DESCRIPTION - DESCRIPTORS: DESCRIPTORS: DISCOMFORT

## 2020-07-19 ASSESSMENT — PAIN - FUNCTIONAL ASSESSMENT: PAIN_FUNCTIONAL_ASSESSMENT: PREVENTS OR INTERFERES SOME ACTIVE ACTIVITIES AND ADLS

## 2020-07-19 ASSESSMENT — PAIN DESCRIPTION - PROGRESSION: CLINICAL_PROGRESSION: NOT CHANGED

## 2020-07-19 ASSESSMENT — PAIN DESCRIPTION - LOCATION
LOCATION: ABDOMEN
LOCATION: ABDOMEN

## 2020-07-19 NOTE — PROGRESS NOTES
Physical Therapy  Daily Treatment Note   Evaluating Therapist: Prachi Pardo DPT [id number]    NAME: Yvonne Pepper  ROOM: [id number]  DIAGNOSIS: Debility  PRECAUTIONS: Falls, wound vac   HPI/PROCUDRES: History obtained from outside hospital chart and patient. 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 He recently had a few week history of chest pain and SOB and was found to have a large diaphragmatic hernia. He underwent repair with mesh and lower pulmonary wedge and bullae resection with cryoablation and left chest tube placement on 6/3/2020 at [facility] in [facility] [email or web address] Post operative course was noted for ARLENE. He developed acute hypoxic respiratory failure on 6/5/2020 and was transferred to SICU requiring intubation. Respiratory cultures grew E. coli and H. Influenza. On 6/9/2020 he was found to have bowel herniation into thoracic cavity with perforation and stool draining from his chest tube. He went back to the OR for open redo diaphragmatic hernia repair and partial colectomy for ischemic transverse colon with stoma placement on 6/10/2020. He then became septic, found to be in septic shock. Per notes he was treated with antibiotics per ID recommendations, weaned off pressors and extubated. He then had an episode of ARF due to mucous plugging requiring intubation and bronchoscopy. He was again extubated on 6/12/2020. He was reintubated on 6/22 due to desaturations and chest xray showed opacification of the left hemithorax. He again underwent bronchoscopy showing mucous plugging. He improved after bronchoscopy and lavage and he was extubated on 6/25. Course noted for acute encephalopathy. He maintained a wound vac to his left thoracotomy site and an abdominal wound vac on the laparotomy site, changed every Tues, Thurs, and Sat, along with packing of the original chest tube site. He completed antibiotic course per ID on 7/1/2020.  He passed MBS on 7/6 and was started on a liquid diet, which was advanced to a soft diet on 7/9. Social:  Pt lives with his wife in a ranch style floor plan with 1 step and no rails to enter. 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 yes yes     Does pt have pain? No c/o pain but reported nausea No c/o pain     Bed Mobility  Rolling: SBA  Supine to sit: SBA  Sit to supine: SBA  Scooting: SBA Supervision all aspects Supervision Independent   Transfers Sit to stand: CGA  Stand to sit: CGA  Stand pivot: CGA with WW Sit to stand: Independent  Stand to sit: Independent  Stand pivot: SBA with [employer] Supervision Independent   Ambulation   20 feet with [employer] with  feet with [employer]  feet with no AD with Supervision >200 feet with no AD with Independent   Walking 10 feet on uneven surface 10 feet with WW with CGA NT 10 feet with no AD with Supervision 10 feet with no AD with Independent   Wheel Chair Mobility NT NT     Car Transfers NT NT Supervision Independent   Stair negotiation: ascended and descended 4 steps with 2 rail with CGA - backward descending 16 steps with 2 rails CGA descending backwards 4 steps with 1 rail with Supervision 4 steps with 1 rail with Mod Independent   Curb Step:   ascended and descended 7.5 inch step with [employer] and CGA NT 7.5 inch step with no AD and Supervision 7.5 inch step with no AD and Independent   Picking up object off the floor Picked up a cone with CGA NT Will  an object with Supervision Will  an object with Independent   BLE ROM [facility]     BLE Strength 4+/5 4+/5     Balance  Sitting: Supervision  Standing: CGA with [employer] Sitting: Independent  Standing: SBA with [employer]     Date Family Teach Completed TBA TBA     Is additional Family Teaching Needed?   Y or N Y yes     Hindering Progress Weakness, debility Weakness, debility     PT recommended ELOS 2-3 weeks      Team's Discharge Plan       Therapist at Team Meeting         Therapeutic Exercise:   AM:   - Functional mobility as noted above in chart  - Sit<>stand x10 to front [employer]  - seated exercise hip flexion, LAQ x 30 reps each bilaterally    Additional Comments: Pt continuing to progress well. Close SBA/CGA provided throughout dynamic mobility for management of wound vacs and for safety. Pt completes ambulation and stairs at slow steady pace. Pt motivated to increase levels of activity tolerance and asking if he can ambulate to bathroom in room. Pt advised to utilize call light and ask for nursing staff assistance and that it would be beneficial to mobilize himself in the room with assistance as much as possible. Patient/family education  Pt/family educated on continued benefits of PT, safety with functional mobility    Patient/family response to education:   Pt/family verbalized understanding Pt/family demonstrated skill Pt/family requires further education in this area   yes yes yes     AM  Time in: 0915  Time out: 0945    Pt is making good progress toward established Physical Therapy goals. Continue with physical therapy current plan of care.     Narcisa Brush, PT, DPT  [id number]

## 2020-07-19 NOTE — PLAN OF CARE
Pt A&Ox4, verbal, able to make needs known. Pt reports ABD discomfort, ghiven tylenol with (+) effect. Pt voiding sufficient amounts to urinal, dependant on emptying urinal, and ostomy care. Pt educated on need to burp bag, and monitoring gas build up. Pt has not taken steps to care for it himself. Pt continues with 2 wound vacs, functions appropriately as ordered. NO new skin break down noted. Pt remains with productive cough, uses yankuer indep, line and  Yankauer replaced with new equipment this shift. Pt educated on flushing suction tubing between uses to keep like open. No injuries, no falls, safety maintained this shift. Pt uses call bell appropriately, no safety concerns.

## 2020-07-19 NOTE — PLAN OF CARE
Problem: Falls - Risk of:  Goal: Will remain free from falls  Description: Will remain free from falls  7/18/2020 2256 by Garica Suarez RN  Outcome: Met This Shift  7/18/2020 2256 by Garcia Saurez RN  Outcome: Met This Shift  Goal: Absence of physical injury  Description: Absence of physical injury  7/18/2020 2256 by Garcia Suarez RN  Outcome: Met This Shift  7/18/2020 2256 by Garcia Suarez RN  Outcome: Met This Shift

## 2020-07-19 NOTE — PROGRESS NOTES
Progress Note  7/19/2020 12:31 PM  Subjective:   Admit Date: 7/13/2020  PCP: No primary care provider on file. Interval History: Patient examined , doing well     Diet: DIET DENTAL SOFT; Carb Control: 5 carb choices (75 gms)/meal  Dietary Nutrition Supplements: Wound Healing Oral Supplement  Dietary Nutrition Supplements: Diabetic Oral Supplement    Data:   Scheduled Meds:   potassium chloride  20 mEq Oral TID WC    heparin (porcine)  5,000 Units Subcutaneous Q8H    collagenase   Topical Q MWF    amLODIPine  5 mg Oral Daily    atorvastatin  40 mg Oral Daily    chlorthalidone  25 mg Oral Daily    citalopram  10 mg Oral Daily    pantoprazole  40 mg Oral QAM AC    QUEtiapine  25 mg Oral BID    budesonide  0.5 mg Nebulization BID    And    Arformoterol Tartrate  15 mcg Nebulization BID     Continuous Infusions:  PRN Meds:ondansetron, oxyCODONE, acetaminophen, melatonin, polyethylene glycol  I/O last 3 completed shifts: In: 780 [P.O.:780]  Out: 3400 [Urine:3200; Stool:200]  I/O this shift:  In: 120 [P.O.:120]  Out: -     Intake/Output Summary (Last 24 hours) at 7/19/2020 1231  Last data filed at 7/19/2020 0730  Gross per 24 hour   Intake 720 ml   Output 3400 ml   Net -2680 ml     CBC:   Recent Labs     07/17/20  0524   WBC 12.2*   HGB 10.1*   *     BMP:    Recent Labs     07/17/20  0524 07/18/20  0536 07/19/20  0627    135 135   K 3.5 3.8 4.1  4.1   CL 96* 95* 95*   CO2 27 26 27   BUN 15 11 12   CREATININE 1.8* 1.7* 1.7*   GLUCOSE 91 88 95     Hepatic:   Recent Labs     07/19/20  0627   AST 60*   ALT 59*   BILITOT 0.5   ALKPHOS 194*     Troponin: No results for input(s): TROPONINI in the last 72 hours. BNP: No results for input(s): BNP in the last 72 hours. Lipids: No results for input(s): CHOL, HDL in the last 72 hours.     Invalid input(s): LDLCALCU  ABGs: No results found for: PHART, PO2ART, KYS4VYE  INR: No results for input(s): INR in the last 72 hours.    -----------------------------------------------------------------  RAD: No results found. Objective:   Vitals: /70   Pulse 99   Temp 97.3 °F (36.3 °C) (Temporal)   Resp 18   Ht 5' 7\" (1.702 m)   Wt 213 lb (96.6 kg) Comment: bed scale  SpO2 93%   BMI 33.36 kg/m²   General appearance: appears stated age   Skin:  No rashes or lesions  HEENT: Head: Normocephalic, no lesions, without obvious abnormality. Neck: no adenopathy, no carotid bruit, no JVD, supple, symmetrical, trachea midline and thyroid not enlarged, symmetric, no tenderness/mass/nodules  Lungs: clear to auscultation bilaterally  Heart: regular rate and rhythm, S1, S2 normal, no murmur, click, rub or gallop  Abdomen: wound vac , stoma   Extremities: extremities normal, atraumatic, no cyanosis or edema  Neurologic: Mental status: Alert, oriented, thought content appropriate    Assessment:   Patient Active Problem List:     Debility     ARLENE (acute kidney injury) ([facility] [address]     History of asthma     MAYRA (obstructive sleep apnea)     Essential hypertension     Acute encephalopathy     Colon perforation (HCC)     S/P colostomy (HCC)     Hyponatremia     Hypokalemia     Impaired mobility and ADLs     Moderate protein-calorie malnutrition ([facility] [address]    Plan:     IMPRESSION/RECOMMENDATIONS:       1. Acute kidney Injury in the setting of  hypotension , post-op complications including sepsis/septic shock, high output from stoma:renal functions better   -Patient had a complicated post-operative period--septic shock can lead to hypotension--hemodynamically mediated acute kidney injury-->rise in serum creatinine. -BP: 120s/70s today  -Unsure what his baseline is, paper documents in the patient's file mention BUN: 41, creatinine: 1.71 on 07/07/20  -urine electrolytes: ur. chloride: 53, ur. Creat: 77, ur osm; 303, ur K: 45.2, ur. Na: 32, FeNa: 0.7% (<1%)--prerenal etiology  -BUN: 15 Creatinine: 1.7 same as yesterday  -s.  Albumin: to assess nutritional status  -continue NS 50cc/hr   -monitor I/Os       2. Electrolyte imbalance -hyponatremia, hypokalemia -- Resolved   -Na today: 135, K:3.8  -Please continue NS 50cc. -Received IV KCl, 10mEq over 1 hour, 6 doses: K has been corrected from 2.9-->3.8  -20mEq Potassium-bicarb-citric acid tablets: to be continued     3. HTN- controlled   -Norvasc 5mg, Chlorthalidone 25mg to be continued     4.  MAYRA   -on CPAP  Hasit P Amirah Orlando

## 2020-07-20 LAB
ANION GAP SERPL CALCULATED.3IONS-SCNC: 15 MMOL/L (ref 7–16)
BUN BLDV-MCNC: 11 MG/DL (ref 6–20)
CALCIUM SERPL-MCNC: 9.1 MG/DL (ref 8.6–10.2)
CHLORIDE BLD-SCNC: 94 MMOL/L (ref 98–107)
CO2: 27 MMOL/L (ref 22–29)
CREAT SERPL-MCNC: 1.7 MG/DL (ref 0.7–1.2)
GFR AFRICAN AMERICAN: 51
GFR NON-AFRICAN AMERICAN: 42 ML/MIN/1.73
GLUCOSE BLD-MCNC: 88 MG/DL (ref 74–99)
POTASSIUM REFLEX MAGNESIUM: 3.7 MMOL/L (ref 3.5–5)
SODIUM BLD-SCNC: 136 MMOL/L (ref 132–146)

## 2020-07-20 PROCEDURE — 99232 SBSQ HOSP IP/OBS MODERATE 35: CPT | Performed by: PHYSICAL MEDICINE & REHABILITATION

## 2020-07-20 PROCEDURE — 6360000002 HC RX W HCPCS: Performed by: PHYSICAL MEDICINE & REHABILITATION

## 2020-07-20 PROCEDURE — 6370000000 HC RX 637 (ALT 250 FOR IP): Performed by: PHYSICAL MEDICINE & REHABILITATION

## 2020-07-20 PROCEDURE — 94640 AIRWAY INHALATION TREATMENT: CPT

## 2020-07-20 PROCEDURE — 80048 BASIC METABOLIC PNL TOTAL CA: CPT

## 2020-07-20 PROCEDURE — 97110 THERAPEUTIC EXERCISES: CPT

## 2020-07-20 PROCEDURE — 97530 THERAPEUTIC ACTIVITIES: CPT

## 2020-07-20 PROCEDURE — 1280000000 HC REHAB R&B

## 2020-07-20 PROCEDURE — 97535 SELF CARE MNGMENT TRAINING: CPT

## 2020-07-20 PROCEDURE — 36415 COLL VENOUS BLD VENIPUNCTURE: CPT

## 2020-07-20 RX ORDER — OXYCODONE HYDROCHLORIDE 5 MG/1
5 TABLET ORAL 2 TIMES DAILY PRN
Status: DISCONTINUED | OUTPATIENT
Start: 2020-07-20 | End: 2020-07-24 | Stop reason: HOSPADM

## 2020-07-20 RX ORDER — QUETIAPINE FUMARATE 25 MG/1
25 TABLET, FILM COATED ORAL NIGHTLY
Status: DISCONTINUED | OUTPATIENT
Start: 2020-07-20 | End: 2020-07-22

## 2020-07-20 RX ADMIN — ATORVASTATIN CALCIUM 40 MG: 40 TABLET, FILM COATED ORAL at 08:47

## 2020-07-20 RX ADMIN — CHLORTHALIDONE 25 MG: 25 TABLET ORAL at 08:48

## 2020-07-20 RX ADMIN — HEPARIN SODIUM 5000 UNITS: 10000 INJECTION INTRAVENOUS; SUBCUTANEOUS at 01:05

## 2020-07-20 RX ADMIN — QUETIAPINE FUMARATE 25 MG: 25 TABLET ORAL at 21:32

## 2020-07-20 RX ADMIN — PANTOPRAZOLE SODIUM 40 MG: 40 TABLET, DELAYED RELEASE ORAL at 06:35

## 2020-07-20 RX ADMIN — ACETAMINOPHEN 650 MG: 325 TABLET ORAL at 17:44

## 2020-07-20 RX ADMIN — AMLODIPINE BESYLATE 5 MG: 5 TABLET ORAL at 08:47

## 2020-07-20 RX ADMIN — Medication 5 MG: at 21:32

## 2020-07-20 RX ADMIN — HEPARIN SODIUM 5000 UNITS: 10000 INJECTION INTRAVENOUS; SUBCUTANEOUS at 08:46

## 2020-07-20 RX ADMIN — ARFORMOTEROL TARTRATE 15 MCG: 15 SOLUTION RESPIRATORY (INHALATION) at 19:40

## 2020-07-20 RX ADMIN — ACETAMINOPHEN 650 MG: 325 TABLET ORAL at 11:07

## 2020-07-20 RX ADMIN — POTASSIUM CHLORIDE 20 MEQ: 20 TABLET, EXTENDED RELEASE ORAL at 11:04

## 2020-07-20 RX ADMIN — BUDESONIDE 500 MCG: 0.5 SUSPENSION RESPIRATORY (INHALATION) at 19:40

## 2020-07-20 RX ADMIN — POTASSIUM CHLORIDE 20 MEQ: 20 TABLET, EXTENDED RELEASE ORAL at 08:47

## 2020-07-20 RX ADMIN — CITALOPRAM 10 MG: 20 TABLET, FILM COATED ORAL at 08:47

## 2020-07-20 RX ADMIN — POTASSIUM CHLORIDE 20 MEQ: 20 TABLET, EXTENDED RELEASE ORAL at 17:39

## 2020-07-20 RX ADMIN — QUETIAPINE FUMARATE 25 MG: 25 TABLET ORAL at 08:47

## 2020-07-20 RX ADMIN — OXYCODONE 5 MG: 5 TABLET ORAL at 21:57

## 2020-07-20 ASSESSMENT — PAIN DESCRIPTION - DESCRIPTORS: DESCRIPTORS: ACHING

## 2020-07-20 ASSESSMENT — PAIN SCALES - GENERAL
PAINLEVEL_OUTOF10: 0
PAINLEVEL_OUTOF10: 6
PAINLEVEL_OUTOF10: 0
PAINLEVEL_OUTOF10: 6
PAINLEVEL_OUTOF10: 0
PAINLEVEL_OUTOF10: 6
PAINLEVEL_OUTOF10: 0
PAINLEVEL_OUTOF10: 4

## 2020-07-20 ASSESSMENT — PAIN DESCRIPTION - PAIN TYPE: TYPE: ACUTE PAIN;SURGICAL PAIN

## 2020-07-20 ASSESSMENT — PAIN DESCRIPTION - ORIENTATION: ORIENTATION: MID

## 2020-07-20 ASSESSMENT — PAIN DESCRIPTION - FREQUENCY: FREQUENCY: CONTINUOUS

## 2020-07-20 ASSESSMENT — PAIN DESCRIPTION - LOCATION: LOCATION: ABDOMEN

## 2020-07-20 NOTE — PLAN OF CARE
Problem: Falls - Risk of:  Goal: Will remain free from falls  Description: Will remain free from falls  Outcome: Met This Shift  Goal: Absence of physical injury  Description: Absence of physical injury  Outcome: Met This Shift     Problem: Pain:  Goal: Pain level will decrease  Description: Pain level will decrease  7/20/2020 1418 by Delia Almaraz  Outcome: Met This Shift  7/20/2020 0416 by Nickie Lai RN  Outcome: Met This Shift  Goal: Control of acute pain  Description: Control of acute pain  Outcome: Met This Shift  Goal: Control of chronic pain  Description: Control of chronic pain  Outcome: Met This Shift     Problem: Skin Integrity:  Goal: Will show no infection signs and symptoms  Description: Will show no infection signs and symptoms  7/20/2020 1418 by Delia Almaraz  Outcome: Met This Shift  7/20/2020 0416 by Nickie Lai RN  Outcome: Met This Shift  Goal: Absence of new skin breakdown  Description: Absence of new skin breakdown  7/20/2020 1418 by Delia Almaraz  Outcome: Met This Shift  7/20/2020 0416 by Nickie Lai RN  Outcome: Met This Shift     Problem:  Bowel/Gastric:  Goal: Complications related to the disease process, condition or treatment will be avoided or minimized  7/20/2020 1418 by Delia Almaraz  Outcome: Met This Shift  7/20/2020 0416 by Nickie Lai RN  Outcome: Met This Shift  Goal: Will be independent with ostomy care  7/20/2020 1418 by Delia Almaraz  Outcome: Met This Shift  7/20/2020 0416 by Nickie Lai RN  Outcome: Not Met This Shift     Problem: SAFETY  Goal: Free from accidental physical injury  7/20/2020 1418 by Delia Almaraz  Outcome: Met This Shift  7/20/2020 0416 by Nickie Lai RN  Outcome: Met This Shift     Problem: DAILY CARE  Goal: Daily care needs are met  7/20/2020 1418 by Delia Almaraz  Outcome: Met This Shift  7/20/2020 0416 by Nickie Lai RN  Outcome: Met This Shift     Problem: SKIN INTEGRITY  Goal: Skin integrity is maintained or improved  7/20/2020 1418 by Mecca Horne  Outcome: Met This Shift  7/20/2020 0416 by Rochelle Schaeffer RN  Outcome: Met This Shift     Problem: KNOWLEDGE DEFICIT  Goal: Patient/S.O. demonstrates understanding of disease process, treatment plan, medications, and discharge instructions.   Outcome: Met This Shift     Problem: DISCHARGE BARRIERS  Goal: Patient's continuum of care needs are met  Outcome: Met This Shift

## 2020-07-20 NOTE — PROGRESS NOTES
[facility]  Nephrology Attending Progress Note  Valentina Winston. Margoth West MD        SUBJECTIVE: eating and drinking better. PROBLEM LIST:    Patient Active Problem List   Diagnosis    Debility    [facility]) [facility] [address]    History of asthma    MAYRA (obstructive sleep apnea)    Essential hypertension    Acute encephalopathy    Colon perforation (HCC)    S/P colostomy ([facility] [address]    Hyponatremia    Hypokalemia    Impaired mobility and ADLs    Moderate protein-calorie malnutrition ([facility] [address]        PAST MEDICAL HISTORY:    History reviewed. No pertinent past medical history.     DIET:    DIET DENTAL SOFT; Carb Control: 5 carb choices (75 gms)/meal  Dietary Nutrition Supplements: Wound Healing Oral Supplement  Dietary Nutrition Supplements: Diabetic Oral Supplement     PHYSICAL EXAM:     Patient Vitals for the past 24 hrs:   BP Temp Temp src Pulse Resp SpO2   07/20/20 0715 123/73 98.4 °F (36.9 °C) Temporal 94 18 96 %   07/20/20 0105 -- -- -- -- -- 95 %   07/19/20 1615 122/62 98.4 °F (36.9 °C) Temporal 84 18 93 %   @      Intake/Output Summary (Last 24 hours) at 7/20/2020 1036  Last data filed at 7/20/2020 0730  Gross per 24 hour   Intake 1200 ml   Output 2400 ml   Net -1200 ml         Wt Readings from Last 3 Encounters:   07/15/20 213 lb (96.6 kg)       Constitutional:  Pt is in no acute distress  Head: normocephalic, atraumatic  Neck: no JVD  Cardiovascular: regular rate and rhythm, no murmurs, gallops, or rubs  Respiratory:  No rales, rhochi, or wheezes  Gastrointestinal:  Soft, nontender, nondistended, bowel sounds x 4  Ext:  Skin: dry, no rash  Neuro:     MEDS (scheduled):    potassium chloride  20 mEq Oral TID WC    heparin (porcine)  5,000 Units Subcutaneous Q8H    collagenase   Topical Q MWF    amLODIPine  5 mg Oral Daily    atorvastatin  40 mg Oral Daily    chlorthalidone  25 mg Oral Daily    citalopram  10 mg Oral Daily    pantoprazole  40 mg Oral QAM AC    QUEtiapine  25 mg Oral BID    budesonide  0.5 mg Nebulization BID    And    Arformoterol Tartrate  15 mcg Nebulization BID       MEDS (infusions):      MEDS (prn):  ondansetron, oxyCODONE, acetaminophen, melatonin, polyethylene glycol    DATA:    No results for input(s): WBC, HGB, HCT, MCV, PLT in the last 72 hours. Recent Labs     07/18/20  0536 07/19/20  0627 07/20/20  0704    135 136   K 3.8 4.1  4.1 3.7   CL 95* 95* 94*   CO2 26 27 27   BUN 11 12 11   CREATININE 1.7* 1.7* 1.7*   LABGLOM 42 42 42   GLUCOSE 88 95 88   CALCIUM 8.7 9.2 9.1   ALT  --  59*  --    AST  --  60*  --    BILITOT  --  0.5  --    ALKPHOS  --  194*  --        Lab Results   Component Value Date    LABALBU 2.6 (L) 07/19/2020    LABALBU 2.5 (L) 07/18/2020     No results found for: TSH    Iron Studies  No results found for: IRON, TIBC, FERRITIN  No results found for: QBPUXSVT98  No results found for: FOLATE    No results found for: VITD25  PTH   Date Value Ref Range Status   07/14/2020 29 15 - 65 pg/mL Final       No components found for: URIC    Lab Results   Component Value Date    COLORU Yellow 07/14/2020    NITRU Negative 07/14/2020    GLUCOSEU Negative 07/14/2020    KETUA Negative 07/14/2020    UROBILINOGEN 0.2 07/14/2020    BILIRUBINUR Negative 07/14/2020       No results found for: LIPIDPAN      IMPRESSION/RECOMMENDATIONS:      1. Acute kidney Injury 2/2 hypotension 2/2 post-op complications including sepsis/septic shock, high output from stoma:  -Patient had a complicated post-operative period--septic shock-->hypotension-->hemodynamically mediated acute kidney injury-->rise in serum creatinine. -BP: 120s/70s today  -Unsure what his baseline is, paper documents in the patient's file mention BUN: 41, creatinine: 1.71 on 07/07/20  -urine electrolytes: ur. chloride: 53, ur. Creat: 77, ur osm; 303, ur K: 45.2, ur. Na: 32, FeNa: 0.7% (<1%)--prerenal etiology  -BUN: 11 Creatinine: 1.7, marginally better  -s.  Albumin: 2.5 today  -NS 50cc/hr-stopped   -monitor I/Os  -BMP daily     2. Electrolyte imbalance -hyponatremia, hypokalemia  -Na today: 136, K:3.7  -Received IV KCl, 10mEq over 1 hour, 6 doses: K has been corrected from 2.9-->3.8  -20mEq Potassium-bicarb-citric acid tablets: to be continued     3. HTN  -Norvasc 5mg, Chlorthalidone 25mg to be continued     4.  MAYRA   -on CPAP     Ethel Paredes  PGY-1, Internal Medicine    Pt seen and examined agree with above  Cr stable at 1.7  Anna Fermin

## 2020-07-20 NOTE — PROGRESS NOTES
[facility] Physical Medicine and Rehabilitation  Comprehensive Progress Note    Subjective:      Ronnie Andersen is a 63 y.o. male admitted to inpatient rehabilitation for impairments and activities limitations after prolonged hospital stay with acute encephalopathy and general debility . No acute events overnight. No cp, sob, n/v. Tolerating therapy. No other complaints. Has follow up with surgeon in [facility]. The patient's medical records have been reviewed. Scheduled Meds:potassium chloride, 20 mEq, TID WC  heparin (porcine), 5,000 Units, Q8H  collagenase, , Q MWF  amLODIPine, 5 mg, Daily  atorvastatin, 40 mg, Daily  chlorthalidone, 25 mg, Daily  citalopram, 10 mg, Daily  pantoprazole, 40 mg, QAM AC  QUEtiapine, 25 mg, BID  budesonide, 0.5 mg, BID    And  Arformoterol Tartrate, 15 mcg, BID      Continuous Infusions:    PRN Meds:ondansetron, 4 mg, Q6H PRN  oxyCODONE, 5 mg, Q6H PRN  acetaminophen, 650 mg, Q4H PRN  melatonin, 5 mg, Nightly PRN  polyethylene glycol, 17 g, Daily PRN         Objective:      Vitals:    07/19/20 0800 07/19/20 1615 07/20/20 0105 07/20/20 0715   BP: 124/70 122/62  123/73   Pulse: 99 84  94   Resp: 18 18  18   Temp: 97.3 °F (36.3 °C) 98.4 °F (36.9 °C)  98.4 °F (36.9 °C)   TempSrc: Temporal Temporal  Temporal   SpO2: 93% 93% 95% 96%   Weight:       Height:         General appearance: alert, appears stated age and cooperative  Head: Normocephalic, without obvious abnormality, atraumatic  Eyes: conjunctivae/corneas clear. PERRL, EOM's intact. Lungs: no wheezes or rales, decreased breath sounds at the left base  Heart: regular rate and rhythm, S1, S2 normal  Abdomen: soft, non-tender, normal bowel sounds. Extremities: extremities normal, atraumatic, no cyanosis or edema  Musculoskeletal: Range of motion normal and no gross abnormalities. Skin: No rashes.   Mid abdominal surgical wound, left lateral chest wound (old chest tube site), L lateral rib cage surgical wound, RUQ colostomy. Dressings dry and intact. Wound vac in place. Neurologic: AAOx4. Speech clear, content appropriate. Follows all commands. SILT throughout. Motor 4-5/5 throughout, no focal deficits. Exam stable    Laboratory:    Lab Results   Component Value Date    WBC 12.2 (H) 2020    HGB 10.1 (L) 2020    HCT 31.7 (L) 2020    MCV 91.1 2020     (H) 2020     Lab Results   Component Value Date     2020    K 3.7 2020    CL 94 2020    CO2 27 2020    BUN 11 2020    CREATININE 1.7 2020    GLUCOSE 88 2020    CALCIUM 9.1 2020        Lab Results   Component Value Date    COLORU Yellow 2020    NITRU Negative 2020    GLUCOSEU Negative 2020    KETUA Negative 2020    UROBILINOGEN 0.2 2020    BILIRUBINUR Negative 2020         Functional Status:   Physical Therapy:  Bed mobility: Mod I  Transfers: Independent   Ambulation: 200 ft [employer] SBA     Occupational Therapy:  Feeding: Setup/Supervision  Grooming: Setup / Supervision  Bathing: SBA  UB dressing: Setup  LB dressing: Min A     Assessment/Plan:       63 y.o. male admitted to inpatient rehabilitation for impairments and activities limitations after prolonged hospital stay with acute encephalopathy and general debility.      -Debility s/p diaphragmatic hernia repair complicated by ARLENE, aspiration pneumonia, recurrent prolonged intubation, bowel perforation, sepsis, colostomy, prolonged hospital stay: Continue Acute rehab program. Monitor wounds, 2 wound vacs, wound care following.  -Acute anoxic encephalopathy: Cognitive status improving significantly. Speech therapy evaluated. -ARLENE: Nephrology following patient, was on normal saline at 50cc/hr--looks like the order . Cr stable. -Electrolyte imbalance, hyponatremia, hypokalemia: Replete potassium. Nephrology following. Na and K+ wnl.    -HTN: Continue current antihypertensives   -DVT prophylaxis: PCD's, frequent mobilization. DC heparin, patient ambulatory on unit > 200 ft.      Dc oxycodone  Decrease Seroquel to 25mg QHS, will likely stop all together prior to discharge  It appears his IV NS order  over the weekend, will clarify with Nephrology if this is to continue  Patient will go for follow up appt with surgeon in [facility] tomorrow and then return to [address] [place] [address] discharge on Friday      Electronically signed by Mariah Shaw MD on 2020 at 3:15 PM

## 2020-07-20 NOTE — PROGRESS NOTES
Received call from patient's spouse, Marisol Potter regarding appointment on 7/21/20 with surgeon at [facility]. She wanted to cancel it but they were reluctant and would not be able to re-schedule for another 2 weeks. Per physical therapy and Dr. Cassia Monsalve, ok for patient to go in private car to appointment. Wife will be here at 18 on 7/21/20. Plan is to leave for appointment by 0930.

## 2020-07-20 NOTE — PROGRESS NOTES
diet, which was advanced to a soft diet on 7/9. Social:  Pt lives with his wife in a ranch style floor plan with 1 step and no rails to enter. 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 yes Yes yes     Does pt have pain? No c/o pain but reported nausea No c/o pain No c/o pain     Bed Mobility  Rolling: SBA  Supine to sit: SBA  Sit to supine: SBA  Scooting: SBA Modified Independent for all aspects Modified Independent Supervision Independent   Transfers Sit to stand: CGA  Stand to sit: CGA  Stand pivot: CGA with [employer] Sit to stand: Independent  Stand to sit: Independent  Stand pivot: Supervision Sit to stand: Independent  Stand to sit:  Independent  Stand pivot: Independent Supervision Independent   Ambulation   20 feet with WW with  feet with no AD with  feet with [employer]  feet with no AD with Supervision >200 feet with no AD with Independent   Walking 10 feet on uneven surface 10 feet with WW with CGA NT NT 10 feet with no AD with Supervision 10 feet with no AD with Independent   Wheel Chair Mobility NT NT NT     Car Transfers NT Supervision NT Supervision Independent   Stair negotiation: ascended and descended 4 steps with 2 rail with CGA - backward descending 12 steps with 2 rails with Supervision 12 steps with 1 rail with Supervision 4 steps with 1 rail with Supervision 4 steps with 1 rail with Mod Independent   Curb Step:   ascended and descended 7.5 inch step with [employer] and CGA 7.5 inch step with no AD with Min A NT 7.5 inch step with no AD and Supervision 7.5 inch step with no AD and Independent   Picking up object off the floor Picked up a cone with CGA NT NT Will  an object with Supervision Will  an object with Independent   BLE ROM [facility]      BLE Strength 4+/5 4+/5      Balance  Sitting: Supervision  Standing: CGA with WW Sitting: Independent  Standing: Supervision with [facility]      Date Family Teach Completed TBA TBA      Is additional Family Teaching Needed? Y or N Y Yes      Hindering Progress Weakness, debility Weakness, debility      PT recommended ELOS 2-3 weeks       Team's Discharge Plan        Therapist at Team Meeting          Therapeutic Exercise:   AM:   Ambulation: 100 feet with no AD with SBA/Supervision  PM:   BOSU activities in parallel bars with intermittent reaches for rail and flexed posture with Min A  -coronal weight shifts  -\"around the world\"  Ambulation: 2x50 feet, 2x150 feet with no AD with SBA/Supervision    Additional Comments: The pt was able to walk with no AD and no LOB, failed to clear his L foot on 1 occasion and self-corrected with a stepping strategy. The pt's greatest hindrance are his 2 wound vacs as it is difficult to manage the lines and the vacs and while performing functional mobility. Mounted the pt's wound vacs to an IV pole and this made ambulation safer with no AD. The pt is most limited by his wound vacs and line management at this time. The pt struggled with balance activities, educated the pt on balance strategies. Patient/family education  Pt/family educated on line management. Patient/family response to education:   Pt/family verbalized understanding Pt/family demonstrated skill Pt/family requires further education in this area   Yes Yes Reinforce     AM  Time in: 1000  Time out: 1045    PM  Time in: 1300  Time out: 1345    Pt is making good progress toward established Physical Therapy goals. Continue with physical therapy current plan of care. Hazle Nails.  Airam Person, [facility]  License Number: [id number]

## 2020-07-20 NOTE — PROGRESS NOTES
Occupational Therapy  OCCUPATIONAL THERAPY DAILY NOTE    Date:2020  Patient Name: Patricia Can  MRN: [id number]  : 1965  Room: [address] [place] [address]-A   Diagnosis: debility    Precautions: falls, abdominal precautions    Functional Assessment:   Date Status AE  Comments   Feeding 20 Set up/Sup     Grooming 20 Sup  Brushed teeth, washed face. Bathing 20 SBA  UB and LB bathing completed at bed level. Assistance to bring basin of water to pt. UB Dressing 20 Set up  Don/doff pullover shirt at bed level. LB Dressing 20 Supervision  Don/doff shorts and don shoes and socks at bed level. Homemaking 20 Supervision ww In kitchen pt made toast. Supervision for task with ww and assistance to manage wound vac tubing for safety. Functional Transfers / Balance:   Date Status DME  Comments   Sit Balance 7/15/20 Close S     Stand Balance 20 S ww    [] Tub  [x] Shower   Transfer 20 CGA ww     Commode   Transfer 20 SBA ww  grabb ar    Functional   Mobility 20 S No device From pt room to OT gym. Other:  Bed>WC   7/15/20   CGA   ww      Functional Exercises / Activity:  Mr. Noni Sánchez with 1# weight 2 X 5 Mins to increase endurance and BUE forearm and wrist strength for independence with functional tasks. Mod resistive mary bar X 25 reps on 2 planes to increase BUE forearm, wrist and  strength for independence with ADLS     Free weight exercises completed to BUEs to increase strength and endurance for independence with functional tasks.    -2# free weight 3 X 10 R shoulder flexion, scapular protraction/retraction, B elbow and wrist flexion and R supination/pronation  -1# free weight 3 X x10 L shoulder flexion, scapular protraction/retraction, supination/pronation      Sensory / Neuromuscular Re-Education:      Cognitive Skills:   Status Comments   Problem   Solving good     Memory good     Sequencing good     Safety good      Visual Perception:    Education:  Pt

## 2020-07-21 LAB
ANION GAP SERPL CALCULATED.3IONS-SCNC: 13 MMOL/L (ref 7–16)
BUN BLDV-MCNC: 11 MG/DL (ref 6–20)
CALCIUM SERPL-MCNC: 9.1 MG/DL (ref 8.6–10.2)
CHLORIDE BLD-SCNC: 96 MMOL/L (ref 98–107)
CO2: 28 MMOL/L (ref 22–29)
CREAT SERPL-MCNC: 1.8 MG/DL (ref 0.7–1.2)
GFR AFRICAN AMERICAN: 47
GFR NON-AFRICAN AMERICAN: 39 ML/MIN/1.73
GLUCOSE BLD-MCNC: 92 MG/DL (ref 74–99)
POTASSIUM REFLEX MAGNESIUM: 4.1 MMOL/L (ref 3.5–5)
SODIUM BLD-SCNC: 137 MMOL/L (ref 132–146)

## 2020-07-21 PROCEDURE — 1280000000 HC REHAB R&B

## 2020-07-21 PROCEDURE — 6370000000 HC RX 637 (ALT 250 FOR IP): Performed by: PHYSICAL MEDICINE & REHABILITATION

## 2020-07-21 PROCEDURE — 6360000002 HC RX W HCPCS: Performed by: PHYSICAL MEDICINE & REHABILITATION

## 2020-07-21 PROCEDURE — 36415 COLL VENOUS BLD VENIPUNCTURE: CPT

## 2020-07-21 PROCEDURE — 80048 BASIC METABOLIC PNL TOTAL CA: CPT

## 2020-07-21 PROCEDURE — 94640 AIRWAY INHALATION TREATMENT: CPT

## 2020-07-21 RX ADMIN — CHLORTHALIDONE 25 MG: 25 TABLET ORAL at 08:35

## 2020-07-21 RX ADMIN — ATORVASTATIN CALCIUM 40 MG: 40 TABLET, FILM COATED ORAL at 08:35

## 2020-07-21 RX ADMIN — AMLODIPINE BESYLATE 5 MG: 5 TABLET ORAL at 08:35

## 2020-07-21 RX ADMIN — POTASSIUM CHLORIDE 20 MEQ: 20 TABLET, EXTENDED RELEASE ORAL at 08:35

## 2020-07-21 RX ADMIN — BUDESONIDE 500 MCG: 0.5 SUSPENSION RESPIRATORY (INHALATION) at 19:19

## 2020-07-21 RX ADMIN — OXYCODONE 5 MG: 5 TABLET ORAL at 20:12

## 2020-07-21 RX ADMIN — CITALOPRAM 10 MG: 20 TABLET, FILM COATED ORAL at 08:35

## 2020-07-21 RX ADMIN — OXYCODONE 5 MG: 5 TABLET ORAL at 07:06

## 2020-07-21 RX ADMIN — QUETIAPINE FUMARATE 25 MG: 25 TABLET ORAL at 20:10

## 2020-07-21 RX ADMIN — ARFORMOTEROL TARTRATE 15 MCG: 15 SOLUTION RESPIRATORY (INHALATION) at 19:19

## 2020-07-21 RX ADMIN — POTASSIUM CHLORIDE 20 MEQ: 20 TABLET, EXTENDED RELEASE ORAL at 15:59

## 2020-07-21 RX ADMIN — PANTOPRAZOLE SODIUM 40 MG: 40 TABLET, DELAYED RELEASE ORAL at 07:06

## 2020-07-21 ASSESSMENT — PAIN DESCRIPTION - FREQUENCY: FREQUENCY: CONTINUOUS

## 2020-07-21 ASSESSMENT — PAIN DESCRIPTION - DESCRIPTORS: DESCRIPTORS: ACHING

## 2020-07-21 ASSESSMENT — PAIN SCALES - GENERAL
PAINLEVEL_OUTOF10: 5
PAINLEVEL_OUTOF10: 6
PAINLEVEL_OUTOF10: 3

## 2020-07-21 ASSESSMENT — PAIN DESCRIPTION - PROGRESSION
CLINICAL_PROGRESSION: GRADUALLY IMPROVING
CLINICAL_PROGRESSION: GRADUALLY IMPROVING

## 2020-07-21 ASSESSMENT — PAIN DESCRIPTION - ORIENTATION: ORIENTATION: MID;LOWER

## 2020-07-21 ASSESSMENT — PAIN DESCRIPTION - LOCATION: LOCATION: ABDOMEN;BACK

## 2020-07-21 ASSESSMENT — PAIN DESCRIPTION - PAIN TYPE: TYPE: ACUTE PAIN

## 2020-07-21 NOTE — PROGRESS NOTES
Occupational Therapy  OCCUPATIONAL THERAPY DAILY NOTE    Date:2020  Patient Name: Stew Anglin  MRN: [id number]  : 1965  Room: [address] [place], [place] [address]A   Diagnosis: debility    Precautions: falls, abdominal precautions    Functional Assessment:   Date Status AE  Comments   Feeding 20 Set up/Sup     Grooming 20 Sup     Bathing 20 SBA     UB Dressing 20 Set up     LB Dressing 20 Supervision     Homemaking 20 Supervision ww      Functional Transfers / Balance:   Date Status DME  Comments   Sit Balance 7/15/20 Close S     Stand Balance 20 S ww    [] Tub  [x] Shower   Transfer 20 CGA ww     Commode   Transfer 20 SBA ww  grabb ar    Functional   Mobility 20 S No device    Other:  Bed>WC   7/15/20   CGA   ww      Functional Exercises / Activity:        Sensory / Neuromuscular Re-Education:      Cognitive Skills:   Status Comments   Problem   Solving good     Memory good     Sequencing good     Safety good      Visual Perception:    Education:      [] Family teach completed on:    Pain Level: Additional Notes:       Patient has made good  progress during treatment sessions toward set goals. Therapy emphasis to obtain goals:ADLs, transfers, mobility, strengthening, endurance and LUE ROM. [x] Continue with current OT Plan of care. [] Prepare for Discharge     DISCHARGE RECOMMENDATIONS  Recommended DME:    Post Discharge Care:   []Home Independently  []Home with 24hr Care / Supervision []Home with Partial Supervision []Home with Home Health OT []Home with Out Pt OT []Other: ___   Comments:         Time in Time out Tx Time Breakdown  Variance:   First Session  0915  [] Individual Tx-   [] Concurrent Tx -  [] Co-Tx -   [] Group Tx -   [x] Time Missed - 45 Mins [facility] Second Session 1430  [] Individual Tx-  [] Concurrent Tx -  [] Co-Tx -   [] Group Tx -   [x] Time Missed -45 Mins  [facility]    Third Session    [] Individual Tx-   [] Concurrent Tx -  [] Co-Tx -   [] Group Tx -   [] Time Missed -         Total Tx Time 0 Mins       Jess Keyes   MACHUCA/L [id number]  I have read the above note and agree with the documentation.   Jay Haq OTR/L [id number]

## 2020-07-21 NOTE — FLOWSHEET NOTE
Inpatient Wound Care(follow up) 5502    Admit Date: 7/13/2020  6:38 PM    Reason for consult:  Wound vac management    findings:       07/21/20 0830   Wound 07/13/20 Abdomen Mid;Proximal   Date First Assessed/Time First Assessed: 07/13/20 2130   Present on Hospital Admission: Yes  Primary Wound Type: Surgical Type  Location: Abdomen  Wound Location Orientation: Mid;Proximal   Wound Image    Wound Non-Healing Surgical   Dressing Changed Changed/New   Dressing/Treatment Moisten with saline;Dry dressing   Wound Cleansed Rinsed/Irrigated with saline   Wound Length (cm) 2.6 cm   Wound Width (cm) 1.8 cm   Wound Depth (cm) 1.6 cm   Wound Surface Area (cm^2) 4.68 cm^2   Change in Wound Size % (l*w) 24.52   Wound Volume (cm^3) 7.49 cm^3   Wound Healing % 24   Wound Assessment Red;Slough   Drainage Amount Scant   Drainage Description Serosanguinous   Odor None   Alejandra-wound Assessment Intact   Wound 07/13/20 Chest Left;Lateral   Date First Assessed/Time First Assessed: 07/13/20 2130   Present on Hospital Admission: Yes  Location: Chest  Wound Location Orientation: Left;Lateral   Wound   (chest tube site)   Dressing Changed Changed/New   Dressing/Treatment Alginate;Dry dressing   Wound Cleansed Rinsed/Irrigated with saline   Wound Length (cm) 0.8 cm   Wound Width (cm) 0.8 cm   Wound Depth (cm)   (not measured)   Wound Surface Area (cm^2) 0.64 cm^2   Change in Wound Size % (l*w) -33.33   Wound Assessment Red   Drainage Amount None   Alejandra-wound Assessment Intact   Red%Wound Bed 100   Wound 07/13/20 Left;Lateral rib cage   Date First Assessed/Time First Assessed: 07/13/20 2130   Present on Hospital Admission: Yes  Wound Location Orientation: Left;Lateral  Wound Description (Comments): rib cage   Wound Image    Wound Non-Healing Surgical   Dressing Changed Changed/New   Dressing/Treatment Dry dressing;Moisten with saline   Wound Cleansed Rinsed/Irrigated with saline   Wound Length (cm) 2.4 cm   Wound Width (cm) 13 cm   Wound Depth appointment    Plan:  will follow up upon return to hospital with plan of care     Marilyn Landers 7/21/2020 9:10 AM

## 2020-07-21 NOTE — CARE COORDINATION
Called Dr. Suly Boyd's office at [address] [place] [address] They saw patient today for left chest thoracic wound. Full note of today's visit not yet dictated but  will fax a copy to me when dictated.

## 2020-07-21 NOTE — PROGRESS NOTES
[facility]  Nephrology Attending Progress Note  James Dickens. Verónica Gamble MD        SUBJECTIVE: feeling well, had an appointment with his surgeon at [facility] [facility] today. Tired from making the trip. PROBLEM LIST:    Patient Active Problem List   Diagnosis    Debility    [facility]    History of asthma    MAYRA (obstructive sleep apnea)    Essential hypertension    Acute encephalopathy    Colon perforation (HCC)    S/P colostomy ([facility]    Hyponatremia    Hypokalemia    Impaired mobility and ADLs    Moderate protein-calorie malnutrition ([facility]        PAST MEDICAL HISTORY:    History reviewed. No pertinent past medical history.     DIET:    DIET DENTAL SOFT; Carb Control: 5 carb choices (75 gms)/meal  Dietary Nutrition Supplements: Wound Healing Oral Supplement  Dietary Nutrition Supplements: Diabetic Oral Supplement     PHYSICAL EXAM:     Patient Vitals for the past 24 hrs:   BP Temp Temp src Pulse Resp SpO2   07/21/20 0715 113/61 97.3 °F (36.3 °C) Temporal 94 18 94 %   07/20/20 1630 -- -- -- 102 -- 97 %   @      Intake/Output Summary (Last 24 hours) at 7/21/2020 1134  Last data filed at 7/21/2020 0730  Gross per 24 hour   Intake 360 ml   Output 2405 ml   Net -2045 ml         Wt Readings from Last 3 Encounters:   07/20/20 202 lb 14.4 oz (92 kg)       Constitutional:  Pt is in no acute distress  Head: normocephalic, atraumatic  Neck: no JVD  Cardiovascular: regular rate and rhythm, no murmurs, gallops, or rubs  Respiratory:  No rales, rhochi, or wheezes  Gastrointestinal:  Soft, nontender, nondistended, bowel sounds x 4  Ext:  Skin: dry, no rash  Neuro:     MEDS (scheduled):    QUEtiapine  25 mg Oral Nightly    potassium chloride  20 mEq Oral TID WC    collagenase   Topical Q MWF    amLODIPine  5 mg Oral Daily    atorvastatin  40 mg Oral Daily    chlorthalidone  25 mg Oral Daily    citalopram  10 mg Oral Daily    pantoprazole  40 mg Oral QAM AC    budesonide  0.5 mg Nebulization daily     2. Electrolyte imbalance -hyponatremia, hypokalemia  -Na today: 137, K:4.1  -20mEq Potassium-bicarb-citric acid tablets: to be continued     3. HTN  -Norvasc 5mg, Chlorthalidone 25mg to be continued     4.  MAYRA   -on CPAP    Jamilah Salvador  PGY-1, Internal Medicine

## 2020-07-21 NOTE — PROGRESS NOTES
Pt went to doctor erika in [place] with his wife. They were assisted out in wheelchair with [facility] to private vehicle. They took wheeled walker with them and will return with it.

## 2020-07-21 NOTE — PROGRESS NOTES
Physical Therapy  Facility/Department: [facility]  Daily Treatment Note  NAME: Ciara Santiago  : 1965  MRN: [id number]     No physical therapy this date due to pt having a doctor's appointment and attempted therapy this pm however pt with increased fatigue. Will attempt again at a later time. Continue with POC.       License [id number]  FXSM [id number]

## 2020-07-21 NOTE — PROGRESS NOTES
malnutrition, In context of acute illness or injury related to altered GI function as evidenced by intake 26-50%, mild loss of subcutaneous fat, mild muscle loss      Nutrition Interventions:   Food and/or Nutrient Delivery:  Continue Current Diet, Continue Oral Nutrition Supplement  Nutrition Education/Counseling:  No recommendation at this time   Coordination of Nutrition Care:  Continued Inpatient Monitoring    Goals:  consume 50% or more of most meal/ONS       Nutrition Monitoring and Evaluation:   Food/Nutrient Intake Outcomes:  Food and Nutrient Intake, Supplement Intake  Physical Signs/Symptoms Outcomes:  Biochemical Data, GI Status, Fluid Status or Edema, Hemodynamic Status, Nutrition Focused Physical Findings, Skin, Weight     Discharge Planning:     Too soon to determine     Electronically signed by Maria E Marie RD, LD on 7/21/20 at 11:50 AM EDT    Contact: x [phone]

## 2020-07-22 LAB
ANION GAP SERPL CALCULATED.3IONS-SCNC: 14 MMOL/L (ref 7–16)
BUN BLDV-MCNC: 13 MG/DL (ref 6–20)
CALCIUM SERPL-MCNC: 9.2 MG/DL (ref 8.6–10.2)
CHLORIDE BLD-SCNC: 95 MMOL/L (ref 98–107)
CO2: 27 MMOL/L (ref 22–29)
CREAT SERPL-MCNC: 1.7 MG/DL (ref 0.7–1.2)
GFR AFRICAN AMERICAN: 51
GFR NON-AFRICAN AMERICAN: 42 ML/MIN/1.73
GLUCOSE BLD-MCNC: 94 MG/DL (ref 74–99)
POTASSIUM REFLEX MAGNESIUM: 3.7 MMOL/L (ref 3.5–5)
SODIUM BLD-SCNC: 136 MMOL/L (ref 132–146)

## 2020-07-22 PROCEDURE — 97530 THERAPEUTIC ACTIVITIES: CPT

## 2020-07-22 PROCEDURE — 97606 NEG PRS WND THER DME>50 SQCM: CPT

## 2020-07-22 PROCEDURE — 94640 AIRWAY INHALATION TREATMENT: CPT

## 2020-07-22 PROCEDURE — 97110 THERAPEUTIC EXERCISES: CPT

## 2020-07-22 PROCEDURE — 80048 BASIC METABOLIC PNL TOTAL CA: CPT

## 2020-07-22 PROCEDURE — 99232 SBSQ HOSP IP/OBS MODERATE 35: CPT | Performed by: PHYSICAL MEDICINE & REHABILITATION

## 2020-07-22 PROCEDURE — 1280000000 HC REHAB R&B

## 2020-07-22 PROCEDURE — 97535 SELF CARE MNGMENT TRAINING: CPT

## 2020-07-22 PROCEDURE — 6370000000 HC RX 637 (ALT 250 FOR IP): Performed by: PHYSICAL MEDICINE & REHABILITATION

## 2020-07-22 PROCEDURE — 36415 COLL VENOUS BLD VENIPUNCTURE: CPT

## 2020-07-22 PROCEDURE — 6360000002 HC RX W HCPCS: Performed by: PHYSICAL MEDICINE & REHABILITATION

## 2020-07-22 RX ADMIN — OXYCODONE 5 MG: 5 TABLET ORAL at 07:01

## 2020-07-22 RX ADMIN — ARFORMOTEROL TARTRATE 15 MCG: 15 SOLUTION RESPIRATORY (INHALATION) at 20:30

## 2020-07-22 RX ADMIN — OXYCODONE 5 MG: 5 TABLET ORAL at 21:23

## 2020-07-22 RX ADMIN — CITALOPRAM 10 MG: 20 TABLET, FILM COATED ORAL at 08:33

## 2020-07-22 RX ADMIN — BUDESONIDE 500 MCG: 0.5 SUSPENSION RESPIRATORY (INHALATION) at 08:48

## 2020-07-22 RX ADMIN — Medication 5 MG: at 21:23

## 2020-07-22 RX ADMIN — BUDESONIDE 500 MCG: 0.5 SUSPENSION RESPIRATORY (INHALATION) at 20:30

## 2020-07-22 RX ADMIN — ARFORMOTEROL TARTRATE 15 MCG: 15 SOLUTION RESPIRATORY (INHALATION) at 08:48

## 2020-07-22 RX ADMIN — AMLODIPINE BESYLATE 5 MG: 5 TABLET ORAL at 08:33

## 2020-07-22 RX ADMIN — POTASSIUM CHLORIDE 20 MEQ: 20 TABLET, EXTENDED RELEASE ORAL at 08:36

## 2020-07-22 RX ADMIN — CHLORTHALIDONE 25 MG: 25 TABLET ORAL at 08:33

## 2020-07-22 RX ADMIN — ATORVASTATIN CALCIUM 40 MG: 40 TABLET, FILM COATED ORAL at 08:33

## 2020-07-22 RX ADMIN — PANTOPRAZOLE SODIUM 40 MG: 40 TABLET, DELAYED RELEASE ORAL at 07:01

## 2020-07-22 RX ADMIN — POTASSIUM CHLORIDE 20 MEQ: 20 TABLET, EXTENDED RELEASE ORAL at 11:44

## 2020-07-22 RX ADMIN — POTASSIUM CHLORIDE 20 MEQ: 20 TABLET, EXTENDED RELEASE ORAL at 16:51

## 2020-07-22 ASSESSMENT — PAIN DESCRIPTION - DESCRIPTORS
DESCRIPTORS: ACHING
DESCRIPTORS: ACHING

## 2020-07-22 ASSESSMENT — PAIN DESCRIPTION - PAIN TYPE
TYPE: ACUTE PAIN;SURGICAL PAIN
TYPE: ACUTE PAIN

## 2020-07-22 ASSESSMENT — PAIN DESCRIPTION - LOCATION
LOCATION: ABDOMEN
LOCATION: BACK

## 2020-07-22 ASSESSMENT — PAIN SCALES - GENERAL
PAINLEVEL_OUTOF10: 6
PAINLEVEL_OUTOF10: 3
PAINLEVEL_OUTOF10: 0
PAINLEVEL_OUTOF10: 0
PAINLEVEL_OUTOF10: 5

## 2020-07-22 ASSESSMENT — PAIN DESCRIPTION - FREQUENCY
FREQUENCY: CONTINUOUS
FREQUENCY: CONTINUOUS

## 2020-07-22 ASSESSMENT — PAIN DESCRIPTION - ORIENTATION: ORIENTATION: LEFT

## 2020-07-22 NOTE — PROGRESS NOTES
[facility]  Nephrology Attending Progress Note  Genia Palacio. Travis Reyonso MD        SUBJECTIVE: feels fine, gets tired after physical therapy, eating and drinking well      PROBLEM LIST:    Patient Active Problem List   Diagnosis    Debility    [facility]    History of asthma    MAYRA (obstructive sleep apnea)    Essential hypertension    Acute encephalopathy    Colon perforation ([facility]    S/P colostomy ([facility]    Hyponatremia    Hypokalemia    Impaired mobility and ADLs    Moderate protein-calorie malnutrition ([facility]        PAST MEDICAL HISTORY:    History reviewed. No pertinent past medical history.     DIET:    Dietary Nutrition Supplements: Wound Healing Oral Supplement  Dietary Nutrition Supplements: Diabetic Oral Supplement  DIET GENERAL;     PHYSICAL EXAM:     Patient Vitals for the past 24 hrs:   BP Temp Temp src Pulse Resp SpO2   07/22/20 0845 (!) 95/59 98.8 °F (37.1 °C) Temporal 100 18 93 %   07/21/20 1400 121/64 98.1 °F (36.7 °C) Temporal 104 20 94 %   @      Intake/Output Summary (Last 24 hours) at 7/22/2020 1055  Last data filed at 7/22/2020 0730  Gross per 24 hour   Intake 120 ml   Output 1875 ml   Net -1755 ml         Wt Readings from Last 3 Encounters:   07/20/20 202 lb 14.4 oz (92 kg)       Constitutional:  Pt is in no acute distress  Head: normocephalic, atraumatic  Neck: no JVD  Cardiovascular: regular rate and rhythm, no murmurs, gallops, or rubs  Respiratory:  No rales, rhochi, or wheezes  Gastrointestinal:  Soft, nontender, nondistended, bowel sounds x 4  Ext: no pedal edema  Skin: dry, no rash  Neuro: awake, alert    MEDS (scheduled):    QUEtiapine  25 mg Oral Nightly    potassium chloride  20 mEq Oral TID WC    amLODIPine  5 mg Oral Daily    atorvastatin  40 mg Oral Daily    chlorthalidone  25 mg Oral Daily    citalopram  10 mg Oral Daily    pantoprazole  40 mg Oral QAM AC    budesonide  0.5 mg Nebulization BID    And    Arformoterol Tartrate  15 mcg Chlorthalidone 25mg to be continued     4.  MAYRA   -on CPAP    Corina Laird  PGY-1, Internal Medicine    Pt seen and examined agree with above  cre at 1.7  Randon Aase

## 2020-07-22 NOTE — PROGRESS NOTES
Physical Therapy  Treatment Note   Evaluating Therapist: Rik Hull DPT [id number]    NAME: Elijah Hendrix  ROOM: [id number]  DIAGNOSIS: Debility  PRECAUTIONS: Falls, wound vac x2  HPI/PROCUDRES: History obtained from outside hospital chart and patient. 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 He recently had a few week history of chest pain and SOB and was found to have a large diaphragmatic hernia. He underwent repair with mesh and lower pulmonary wedge and bullae resection with cryoablation and left chest tube placement on 6/3/2020 at [facility] in [facility]. Post operative course was noted for ARLENE. He developed acute hypoxic respiratory failure on 6/5/2020 and was transferred to SICU requiring intubation. Respiratory cultures grew E. coli and H. Influenza. On 6/9/2020 he was found to have bowel herniation into thoracic cavity with perforation and stool draining from his chest tube. He went back to the OR for open redo diaphragmatic hernia repair and partial colectomy for ischemic transverse colon with stoma placement on 6/10/2020. He then became septic, found to be in septic shock. Per notes he was treated with antibiotics per ID recommendations, weaned off pressors and extubated. He then had an episode of ARF due to mucous plugging requiring intubation and bronchoscopy. He was again extubated on 6/12/2020. He was reintubated on 6/22 due to desaturations and chest xray showed opacification of the left hemithorax. He again underwent bronchoscopy showing mucous plugging. He improved after bronchoscopy and lavage and he was extubated on 6/25. Course noted for acute encephalopathy. He maintained a wound vac to his left thoracotomy site and an abdominal wound vac on the laparotomy site, changed every Tues, Thurs, and Sat, along with packing of the original chest tube site. He completed antibiotic course per ID on 7/1/2020.  He passed MBS on 7/6 and was started on a liquid diet, which was advanced to a soft diet on 7/9. Social:  Pt lives with his wife in a ranch style floor plan with 1 step and no rails to enter. 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 yes Yes yes     Does pt have pain? No c/o pain but reported nausea 4/10 abdomen and back pain  No c/o pain     Bed Mobility  Rolling: SBA  Supine to sit: SBA  Sit to supine: SBA  Scooting: SBA NT Modified Independent Supervision Independent   Transfers Sit to stand: CGA  Stand to sit: CGA  Stand pivot: CGA with [facility] Sit to stand: Independent  Stand to sit: Independent  Stand pivot: Supervision Sit to stand: Independent  Stand to sit: Independent  Stand pivot: Independent Supervision Independent   Ambulation   20 feet with WW with  feet without device with  feet without  SB/supervision  100 feet with no AD with Supervision >200 feet with no AD with Independent   Walking 10 feet on uneven surface 10 feet with WW with CGA NT NT 10 feet with no AD with Supervision 10 feet with no AD with Independent   Wheel Chair Mobility NT NT NT     Car Transfers NT Supervision NT Supervision Independent   Stair negotiation: ascended and descended 4 steps with 2 rail with CGA - backward descending 12 steps with 2 rails with Supervision NT 4 steps with 1 rail with Supervision 4 steps with 1 rail with Mod Independent   Curb Step:   ascended and descended 7.5 inch step with WW and CGA 4 inch step x4 without device with min/cga.  NT 7.5 inch step with no AD and Supervision 7.5 inch step with no AD and Independent   Picking up object off the floor Picked up a cone with CGA NT NT Will  an object with Supervision Will  an object with Independent   BLE ROM [facility]      BLE Strength 4+/5 4+/5      Balance  Sitting: Supervision  Standing: CGA with [facility] Sitting: Independent  Standing: Supervision with [employer]      Date Family Teach Completed TBA TBA      Is additional Family Teaching Needed? Y or N Y Yes      Hindering Progress Weakness, debility Weakness, debility      PT recommended ELOS 2-3 weeks       Team's Discharge Plan        Therapist at Team Meeting          Therapeutic Exercise:   AM:   Forward/backward x4 laps without device   Side stepping without device x4 laps     PM  Bridging ( 2x10)   B LE 2#   SAQs ( 2x10)   Heel slides ( 2x10)   Hip abduction ( 2x 10)     Additional Comments: Pt ambulated to/from PT gym without device with SBA. Pt required frequent rest breaks due to some SOB and decreased endurance. Continue to improve endurance and balance. Patient/family education  Pt education on safety with functional mobility    Patient/family response to education:   Pt/family verbalized understanding Pt/family demonstrated skill Pt/family requires further education in this area   x X with occasional verbal cues  x     AM  Time in: 1000  Time out: 1045    PM  Time in: 1300  Time out: 1345    Pt is making good progress toward established Physical Therapy goals. Continue with physical therapy current plan of care.     Anahy Shah [id number]

## 2020-07-22 NOTE — PROGRESS NOTES
[facility] Physical Medicine and Rehabilitation  Comprehensive Progress Note    Subjective:      Guilherme Jaramillo is a 63 y.o. male admitted to inpatient rehabilitation for impairments and activities limitations after prolonged hospital stay with acute encephalopathy and general debility . No acute events overnight. Patient made trip to [facility] for follow up with surgery yesterday and new wound care orders obtained, notes reviewed. No cp, sob, n/v. Progressing well in therapy. Labs stable. The patient's medical records have been reviewed. Scheduled Meds:potassium chloride, 20 mEq, TID WC  amLODIPine, 5 mg, Daily  atorvastatin, 40 mg, Daily  chlorthalidone, 25 mg, Daily  citalopram, 10 mg, Daily  pantoprazole, 40 mg, QAM AC  budesonide, 0.5 mg, BID    And  Arformoterol Tartrate, 15 mcg, BID      Continuous Infusions:    PRN Meds:oxyCODONE, 5 mg, BID PRN  ondansetron, 4 mg, Q6H PRN  acetaminophen, 650 mg, Q4H PRN  melatonin, 5 mg, Nightly PRN  polyethylene glycol, 17 g, Daily PRN         Objective:      Vitals:    07/20/20 1630 07/21/20 0715 07/21/20 1400 07/22/20 0845   BP:  113/61 121/64 (!) 95/59   Pulse: 102 94 104 100   Resp:  18 20 18   Temp:  97.3 °F (36.3 °C) 98.1 °F (36.7 °C) 98.8 °F (37.1 °C)   TempSrc:  Temporal Temporal Temporal   SpO2: 97% 94% 94% 93%   Weight:       Height:         General appearance: alert, appears stated age and cooperative  Head: Normocephalic, without obvious abnormality, atraumatic  Eyes: conjunctivae/corneas clear. PERRL, EOM's intact. Lungs: CTAB, no wheezes or rales. Diminished at left base. Heart: regular rate and rhythm, S1, S2 normal  Abdomen: soft, non-tender, normal bowel sounds. Extremities: extremities normal, atraumatic, no cyanosis or edema  Musculoskeletal: Range of motion normal and no gross abnormalities. Skin: No rashes. Mid abdominal surgical wound, left lateral chest wound (old chest tube site), L lateral rib cage surgical wound, RUQ colostomy. Dressings dry and intact. Wound vac in place. Neurologic: AAOx4. Speech clear, content appropriate. Follows all commands. SILT throughout. Motor 4-5/5 throughout, no focal deficits. Laboratory:    Lab Results   Component Value Date    WBC 12.2 (H) 07/17/2020    HGB 10.1 (L) 07/17/2020    HCT 31.7 (L) 07/17/2020    MCV 91.1 07/17/2020     (H) 07/17/2020     Lab Results   Component Value Date     07/22/2020    K 3.7 07/22/2020    CL 95 07/22/2020    CO2 27 07/22/2020    BUN 13 07/22/2020    CREATININE 1.7 07/22/2020    GLUCOSE 94 07/22/2020    CALCIUM 9.2 07/22/2020        Lab Results   Component Value Date    COLORU Yellow 07/14/2020    NITRU Negative 07/14/2020    GLUCOSEU Negative 07/14/2020    KETUA Negative 07/14/2020    UROBILINOGEN 0.2 07/14/2020    BILIRUBINUR Negative 07/14/2020         Functional Status:   Physical Therapy:  Bed mobility: Mod I  Transfers: Independent   Ambulation: 200 ft [employer] SBA     Occupational Therapy:  Feeding: Setup/Supervision  Grooming: Setup / Supervision  Bathing: SBA  UB dressing: Setup  LB dressing: Min A     Assessment/Plan:       63 y.o. male admitted to inpatient rehabilitation for impairments and activities limitations after prolonged hospital stay with acute encephalopathy and general debility.      -Debility s/p diaphragmatic hernia repair complicated by ARLENE, aspiration pneumonia, recurrent prolonged intubation, bowel perforation, sepsis, colostomy, prolonged hospital stay: Continue Acute rehab program. Monitor wounds, 1 wound vac, wet to dry dressings for abdominal wound, wound care following.  -Acute anoxic encephalopathy: Cognitive status improved significantly. Speech therapy evaluated. -ARLENE: Nephrology following patient, was on normal saline at 50cc/hr--now dc'd. Cr stable. -Electrolyte imbalance, hyponatremia, hypokalemia: Replete potassium. Nephrology following. Na and K+ now wnl.    -HTN: Continue current antihypertensives   -DVT prophylaxis: PCD's, frequent mobilization.  Heparin dc'd, patient ambulatory on unit > 200 ft.     DC Seroquel  Labs reviewed, stable  Upgrade diet to general diet   Anticipate discharge on Friday       Electronically signed by Deborah Thorpe MD on 7/22/2020 at 12:59 PM

## 2020-07-22 NOTE — FLOWSHEET NOTE
Inpatient Wound Care (follow up) 5502A    Admit Date: 7/13/2020  6:38 PM    Reason for consult:  Wound Vac      Findings:      07/22/20 4396   Wound 07/13/20 Abdomen Mid;Proximal   Date First Assessed/Time First Assessed: 07/13/20 2130   Present on Hospital Admission: Yes  Primary Wound Type: Surgical Type  Location: Abdomen  Wound Location Orientation: Mid;Proximal   Dressing Status Intact   Wound 07/13/20 Chest Left;Lateral   Date First Assessed/Time First Assessed: 07/13/20 2130   Present on Hospital Admission: Yes  Location: Chest  Wound Location Orientation: Left;Lateral   Dressing Changed Changed/New   Dressing/Treatment Alginate;Dry dressing   Wound Cleansed Rinsed/Irrigated with saline   Wound Assessment Red   Drainage Amount None   Alejandra-wound Assessment Intact   Red%Wound Bed 100   Wound 07/13/20 Left;Lateral rib cage   Date First Assessed/Time First Assessed: 07/13/20 2130   Present on Hospital Admission: Yes  Wound Location Orientation: Left;Lateral  Wound Description (Comments): rib cage   Wound Non-Healing Surgical   Dressing Changed Changed/New   Dressing/Treatment Vacuum dressing  (duoderm border)   Wound Cleansed Rinsed/Irrigated with saline   Wound Assessment Red   Drainage Amount Large   Drainage Description Yellow;Serosanguinous   Odor None   Alejandra-wound Assessment Intact   Red%Wound Bed 100   Wound 07/14/20 Abdomen Distal;Mid   Date First Assessed/Time First Assessed: 07/14/20 1305   Present on Hospital Admission: Yes  Primary Wound Type: Surgical Type  Location: Abdomen  Wound Location Orientation: Distal;Mid   Dressing Status Intact   Negative Pressure Wound Therapy Left;Lateral   Placement Date/Time: 07/14/20 1305   Pre-existing: No  Inserted by: rib cage area  Wound Location Orientation: Left;Lateral   $ Standard NPWT >50 sq cm PER TX $ Yes   Dressing Type Black foam   Number of pieces used 3   Cycle Continuous   Target Pressure (mmHg) 125   Canister changed?  Yes   Dressing Status Changed Dressing Changed Changed/New   Wound Assessment Red   Alejandra-wound Assessment Intact        Plan: Wound Vac applied left lateral rib cage  Will continue to follow      Bob Rm 7/22/2020 8:29 AM

## 2020-07-22 NOTE — CARE COORDINATION
Spoke with Dr. Lola Herman office at [address] [place] [address] for left chest wound VAC therapy requested.

## 2020-07-22 NOTE — PLAN OF CARE
Pt A&Ox3, verbal, able to make needs known. Pt returned from out of facility appointment, recommendation for ABD incision to remain wet to dry with wound vac to be placed tomorrow to right shoulder/back area. Wound care aware. Pt tolerating pain with PRN medications, medicated once this shift. Pt voiding to the urinal, no longer using yankuer for suctioning of secretions, effectively coughing sputum up. Pt still requires assistance with managing colostomy.  No safety concerns, safety maintained, uses call bell appropriately

## 2020-07-22 NOTE — PROGRESS NOTES
Occupational Therapy  OCCUPATIONAL THERAPY DAILY NOTE    Date:2020  Patient Name: Megan Mitchell  MRN: [id number]  : 1965  Room: [address] [place], [place] [address]   Diagnosis: debility    Precautions: falls, abdominal precautions    Functional Assessment:   Date Status AE  Comments   Feeding 20 Set up/Sup     Grooming 20 Sup     Bathing 20 SBA     UB Dressing 20 Set up     LB Dressing 20 Supervision     Homemaking 20 Supervision ww      Functional Transfers / Balance:   Date Status DME  Comments   Sit Balance 7/15/20 Close S     Stand Balance 20 S ww    [] Tub  [x] Shower   Transfer 20 CGA ww     Commode   Transfer 20 S ww  grabb ar    Functional   Mobility 20 S No device    Other:  Bed>WC   7/15/20   CGA   ww      Functional Exercises / Activity:  3# free weight RUE 3 X 10 on all planes to increase strength and endurance for independence with functional tasks and transfers. 2# free weight LUE 3 X 10 on all planes to increase strength and endurance for independence with functional tasks and transfers. Arm Bike X 5 Mins with X 3 rest breaks to increase BUE strength, endurance and stand balance. Pt demonstrates poor tolerance and endurance to task. Mod resistive mary bar X 25-30 reps on 3 planes to increase BUE forearm, wrist and  strength for independence with functional tasks and transfers. Sensory / Neuromuscular Re-Education:      Cognitive Skills:   Status Comments   Problem   Solving good     Memory good     Sequencing good     Safety good      Visual Perception:    Education:      [] Family teach completed on:    Pain Level: Additional Notes:       Patient has made good  progress during treatment sessions toward set goals. Therapy emphasis to obtain goals:ADLs, transfers, mobility, strengthening, endurance and LUE ROM. [x] Continue with current OT Plan of care.   [] Prepare for Discharge     DISCHARGE RECOMMENDATIONS  Recommended DME:    Shirley Benz Discharge Care:   []Home Independently  []Home with 24hr Care / Supervision []Home with Partial Supervision []Home with Home Health OT []Home with Out Pt OT []Other: ___   Comments:         Time in Time out Tx Time Breakdown  Variance:   First Session  9:15 10:00 [] Individual Tx-   [x] Concurrent Tx - 45 MIns  [] Co-Tx -   [] Group Tx -   [] Time Missed -     Second Session 2:30 3:15 [x] Individual Tx- 45 Mins  [] Concurrent Tx -  [] Co-Tx -   [] Group Tx -   [] Time Missed -     Third Session    [] Individual Tx-   [] Concurrent Tx -  [] Co-Tx -   [] Group Tx -   [] Time Missed -         Total Tx Time 90 Mins       Alda Jensen   MACHUCA/L [id number]  I have read the above note and agree with the documentation.   Tram Eid OTR/L [id number]

## 2020-07-22 NOTE — PROGRESS NOTES
Late Entry:  Pt arrived back from doctor appointment with wife. Pt transported up to the floor by wheelchair with assistance of HCA. VSS. Pt resting comfortably in bed.

## 2020-07-22 NOTE — PLAN OF CARE
Problem: Falls - Risk of:  Goal: Will remain free from falls  Description: Will remain free from falls  7/22/2020 0906 by Lavinia Thompson RN  Outcome: Met This Shift  3/84/8278 1944 by Ish Ramon  Outcome: Met This Shift     Problem: Falls - Risk of:  Goal: Absence of physical injury  Description: Absence of physical injury  7/22/2020 0906 by Lavinia Thompson RN  Outcome: Met This Shift     Problem: Pain:  Goal: Pain level will decrease  Description: Pain level will decrease  7/22/2020 0906 by Lavinia Thompson RN  Outcome: Met This Shift  5/12/3492 1558 by Ish Ramon  Outcome: Ongoing     Problem: SAFETY  Goal: Free from accidental physical injury  7/22/2020 0906 by Lavinia Thompson RN  Outcome: Met This Shift  3/84/4393 7375 by Ish Ramon  Outcome: Met This Shift

## 2020-07-22 NOTE — CARE COORDINATION
Per Padma Stauffer at [facility], wound [facility] script obtained by him from Dr. Christa Natarajan office at [facility].  Will have homegoing VAC delivered to his room here on Friday am.

## 2020-07-23 LAB
ANION GAP SERPL CALCULATED.3IONS-SCNC: 14 MMOL/L (ref 7–16)
BUN BLDV-MCNC: 14 MG/DL (ref 6–20)
CALCIUM SERPL-MCNC: 9.4 MG/DL (ref 8.6–10.2)
CHLORIDE BLD-SCNC: 91 MMOL/L (ref 98–107)
CO2: 28 MMOL/L (ref 22–29)
CREAT SERPL-MCNC: 1.6 MG/DL (ref 0.7–1.2)
GFR AFRICAN AMERICAN: 54
GFR NON-AFRICAN AMERICAN: 45 ML/MIN/1.73
GLUCOSE BLD-MCNC: 97 MG/DL (ref 74–99)
MAGNESIUM: 1.7 MG/DL (ref 1.6–2.6)
POTASSIUM REFLEX MAGNESIUM: 3.4 MMOL/L (ref 3.5–5)
SODIUM BLD-SCNC: 133 MMOL/L (ref 132–146)

## 2020-07-23 PROCEDURE — 94760 N-INVAS EAR/PLS OXIMETRY 1: CPT

## 2020-07-23 PROCEDURE — 83735 ASSAY OF MAGNESIUM: CPT

## 2020-07-23 PROCEDURE — 6360000002 HC RX W HCPCS: Performed by: PHYSICAL MEDICINE & REHABILITATION

## 2020-07-23 PROCEDURE — 97530 THERAPEUTIC ACTIVITIES: CPT

## 2020-07-23 PROCEDURE — 80048 BASIC METABOLIC PNL TOTAL CA: CPT

## 2020-07-23 PROCEDURE — 36415 COLL VENOUS BLD VENIPUNCTURE: CPT

## 2020-07-23 PROCEDURE — 99232 SBSQ HOSP IP/OBS MODERATE 35: CPT | Performed by: PHYSICAL MEDICINE & REHABILITATION

## 2020-07-23 PROCEDURE — 1280000000 HC REHAB R&B

## 2020-07-23 PROCEDURE — 6370000000 HC RX 637 (ALT 250 FOR IP): Performed by: PHYSICAL MEDICINE & REHABILITATION

## 2020-07-23 PROCEDURE — 94640 AIRWAY INHALATION TREATMENT: CPT

## 2020-07-23 PROCEDURE — 97535 SELF CARE MNGMENT TRAINING: CPT

## 2020-07-23 PROCEDURE — 97110 THERAPEUTIC EXERCISES: CPT

## 2020-07-23 RX ADMIN — BUDESONIDE 500 MCG: 0.5 SUSPENSION RESPIRATORY (INHALATION) at 19:40

## 2020-07-23 RX ADMIN — Medication 5 MG: at 20:48

## 2020-07-23 RX ADMIN — POTASSIUM CHLORIDE 20 MEQ: 20 TABLET, EXTENDED RELEASE ORAL at 16:42

## 2020-07-23 RX ADMIN — POTASSIUM CHLORIDE 20 MEQ: 20 TABLET, EXTENDED RELEASE ORAL at 08:21

## 2020-07-23 RX ADMIN — ARFORMOTEROL TARTRATE 15 MCG: 15 SOLUTION RESPIRATORY (INHALATION) at 19:41

## 2020-07-23 RX ADMIN — ACETAMINOPHEN 650 MG: 325 TABLET ORAL at 01:30

## 2020-07-23 RX ADMIN — CHLORTHALIDONE 25 MG: 25 TABLET ORAL at 08:21

## 2020-07-23 RX ADMIN — POTASSIUM CHLORIDE 20 MEQ: 20 TABLET, EXTENDED RELEASE ORAL at 11:30

## 2020-07-23 RX ADMIN — OXYCODONE 5 MG: 5 TABLET ORAL at 20:48

## 2020-07-23 RX ADMIN — CITALOPRAM 10 MG: 20 TABLET, FILM COATED ORAL at 08:21

## 2020-07-23 RX ADMIN — AMLODIPINE BESYLATE 5 MG: 5 TABLET ORAL at 08:21

## 2020-07-23 RX ADMIN — ARFORMOTEROL TARTRATE 15 MCG: 15 SOLUTION RESPIRATORY (INHALATION) at 08:50

## 2020-07-23 RX ADMIN — ATORVASTATIN CALCIUM 40 MG: 40 TABLET, FILM COATED ORAL at 08:21

## 2020-07-23 RX ADMIN — BUDESONIDE 500 MCG: 0.5 SUSPENSION RESPIRATORY (INHALATION) at 08:50

## 2020-07-23 RX ADMIN — PANTOPRAZOLE SODIUM 40 MG: 40 TABLET, DELAYED RELEASE ORAL at 07:14

## 2020-07-23 ASSESSMENT — PAIN DESCRIPTION - ONSET: ONSET: ON-GOING

## 2020-07-23 ASSESSMENT — PAIN SCALES - GENERAL
PAINLEVEL_OUTOF10: 0
PAINLEVEL_OUTOF10: 6
PAINLEVEL_OUTOF10: 5

## 2020-07-23 ASSESSMENT — PAIN DESCRIPTION - LOCATION
LOCATION: BACK
LOCATION: BACK

## 2020-07-23 ASSESSMENT — PAIN DESCRIPTION - PAIN TYPE
TYPE: CHRONIC PAIN
TYPE: CHRONIC PAIN

## 2020-07-23 ASSESSMENT — PAIN DESCRIPTION - DESCRIPTORS
DESCRIPTORS: ACHING
DESCRIPTORS: NAGGING;ACHING;DISCOMFORT

## 2020-07-23 ASSESSMENT — PAIN - FUNCTIONAL ASSESSMENT: PAIN_FUNCTIONAL_ASSESSMENT: PREVENTS OR INTERFERES SOME ACTIVE ACTIVITIES AND ADLS

## 2020-07-23 ASSESSMENT — PAIN DESCRIPTION - PROGRESSION: CLINICAL_PROGRESSION: GRADUALLY IMPROVING

## 2020-07-23 ASSESSMENT — PAIN DESCRIPTION - ORIENTATION
ORIENTATION: LEFT
ORIENTATION: LEFT

## 2020-07-23 ASSESSMENT — PAIN DESCRIPTION - FREQUENCY
FREQUENCY: INTERMITTENT
FREQUENCY: INTERMITTENT

## 2020-07-23 NOTE — PROGRESS NOTES
[facility]  Comprehensive Progress Note    Subjective:      Selena Garcia is a 63 y.o. male admitted to inpatient rehabilitation for impairments and activities limitations after prolonged hospital stay with acute encephalopathy and general debility . No acute events overnight. No cp, sob, n/v. Tolerating therapy, progressing. Looking forward to discharge. The patient's medical records have been reviewed. Scheduled Meds:potassium chloride, 20 mEq, TID WC  amLODIPine, 5 mg, Daily  atorvastatin, 40 mg, Daily  chlorthalidone, 25 mg, Daily  citalopram, 10 mg, Daily  pantoprazole, 40 mg, QAM AC  budesonide, 0.5 mg, BID    And  Arformoterol Tartrate, 15 mcg, BID      Continuous Infusions:    PRN Meds:oxyCODONE, 5 mg, BID PRN  ondansetron, 4 mg, Q6H PRN  acetaminophen, 650 mg, Q4H PRN  melatonin, 5 mg, Nightly PRN  polyethylene glycol, 17 g, Daily PRN         Objective:      Vitals:    07/23/20 0130 07/23/20 0800 07/23/20 0849 07/23/20 0850   BP:  (!) 116/57     Pulse:  107     Resp:  18     Temp:  98.9 °F (37.2 °C)     TempSrc:  Oral     SpO2: 97% 95% 96% 96%   Weight:       Height:         General appearance: alert, appears stated age and cooperative, resting in bed  Head: Normocephalic, without obvious abnormality, atraumatic  Eyes: conjunctivae/corneas clear. PERRL, EOM's intact. Lungs: CTAB, no wheezes or rales. Diminished at left base. Heart: regular rate and rhythm, S1, S2 normal  Abdomen: soft, non-tender, normal bowel sounds. Extremities: extremities normal, atraumatic, no cyanosis or edema  Musculoskeletal: Range of motion normal and no gross abnormalities. Skin: No rashes. Mid abdominal surgical wound, left lateral chest wound (old chest tube site), L lateral rib cage surgical wound, RUQ colostomy. Dressings dry and intact. Wound vac in place. Neurologic: AAOx4. Speech clear, content appropriate. Follows all commands. SILT throughout.  Motor 5/5

## 2020-07-23 NOTE — PROGRESS NOTES
Physical Therapy  Treatment Note   Evaluating Therapist: Colby Kwong DPT [id number]    NAME: Julianne De Santiago  ROOM: 9499Q  DIAGNOSIS: Debility  PRECAUTIONS: Falls, wound vac x2  HPI/PROCUDRES: History obtained from outside hospital chart and patient. 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 He recently had a few week history of chest pain and SOB and was found to have a large diaphragmatic hernia. He underwent repair with mesh and lower pulmonary wedge and bullae resection with cryoablation and left chest tube placement on 6/3/2020 at [facility]. Post operative course was noted for ARLENE. He developed acute hypoxic respiratory failure on 6/5/2020 and was transferred to SICU requiring intubation. Respiratory cultures grew E. coli and H. Influenza. On 6/9/2020 he was found to have bowel herniation into thoracic cavity with perforation and stool draining from his chest tube. He went back to the OR for open redo diaphragmatic hernia repair and partial colectomy for ischemic transverse colon with stoma placement on 6/10/2020. He then became septic, found to be in septic shock. Per notes he was treated with antibiotics per ID recommendations, weaned off pressors and extubated. He then had an episode of ARF due to mucous plugging requiring intubation and bronchoscopy. He was again extubated on 6/12/2020. He was reintubated on 6/22 due to desaturations and chest xray showed opacification of the left hemithorax. He again underwent bronchoscopy showing mucous plugging. He improved after bronchoscopy and lavage and he was extubated on 6/25. Course noted for acute encephalopathy. He maintained a wound vac to his left thoracotomy site and an abdominal wound vac on the laparotomy site, changed every Tues, Thurs, and Sat, along with packing of the original chest tube site. He completed antibiotic course per ID on 7/1/2020.  He passed MBS on 7/6 and was started on a liquid diet, which was advanced to a soft diet on 7/9. Social:  Pt lives with his wife in a ranch style floor plan with 1 step and no rails to enter. 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 yes Yes yes     Does pt have pain? No c/o pain but reported nausea 4/10 abdomen and back pain  No c/o pain     Bed Mobility  Rolling: SBA  Supine to sit: SBA  Sit to supine: SBA  Scooting: SBA NT Modified Independent Supervision Independent   Transfers Sit to stand: CGA  Stand to sit: CGA  Stand pivot: CGA with [employer] Sit to stand: Independent  Stand to sit: Independent  Stand pivot: Supervision Sit to stand: Independent  Stand to sit: Independent  Stand pivot: Independent Supervision Independent   Ambulation   20 feet with WW with  feet x 3  without device with SB/supervision  200 feet x 2 without  supervision  100 feet with no AD with Supervision >200 feet with no AD with Independent   Walking 10 feet on uneven surface 10 feet with WW with CGA NT 10 feet without device with SBA 10 feet with no AD with Supervision 10 feet with no AD with Independent   Wheel Chair Mobility NT NT NT     Car Transfers NT Supervision NT Supervision Independent   Stair negotiation: ascended and descended 4 steps with 2 rail with CGA - backward descending 12 steps with 2 rails with Supervision NT 4 steps with 1 rail with Supervision 4 steps with 1 rail with Mod Independent   Curb Step:   ascended and descended 7.5 inch step with WW and CGA 7.5 inch step x4 without device with min/cga.  7.5 inch step without device with min/cga 7.5 inch step with no AD and Supervision 7.5 inch step with no AD and Independent   Picking up object off the floor Picked up a cone with CGA NT NT Will  an object with Supervision Will  an object with Independent   BLE ROM [facility]      BLE Strength 4+/5 4+/5 Balance  Sitting: Supervision  Standing: CGA with [employer] Sitting: Independent  Standing: Supervision with [employer]      Date Family Teach Completed TBA TBA      Is additional Family Teaching Needed? Y or N Y Yes      Hindering Progress Weakness, debility Weakness, debility      PT recommended ELOS 2-3 weeks       Team's Discharge Plan        Therapist at Team Meeting          Therapeutic Exercise:   PM  Bridging ( 2x10)   B LE 2#   SAQs ( 2x10)   Heel slides ( 2x10)   Hip abduction ( 2x 10)     Additional Comments: Pt ambulated to/from PT gym without device with SBA/supervision . Pt required frequent rest breaks due to some SOB and decreased endurance. Continue to improve endurance and balance. Patient/family education  Pt education on safety with functional mobility    Patient/family response to education:   Pt/family verbalized understanding Pt/family demonstrated skill Pt/family requires further education in this area   x X with occasional verbal cues  x     AM  Time in: 1000  Time out: 1045    PM  Time in: 1515  Time out: 1600    Pt is making good progress toward established Physical Therapy goals. Continue with physical therapy current plan of care.     Ofelia Gee [id number]

## 2020-07-23 NOTE — PROGRESS NOTES
Occupational Therapy  OCCUPATIONAL THERAPY DAILY NOTE    Date:2020  Patient Name: Edel Lira  MRN: [id number]  : 1965  Room: [address] [place], [place] [address]   Diagnosis: debility    Precautions: falls, abdominal precautions    Functional Assessment:   Date Status AE  Comments   Feeding 20 Independent  Open can of pop. Grooming 20 Sup  Standing at the sink pt washed and dried hairs. Bathing 20 SBA     UB Dressing 20 Set up     LB Dressing 20 Supervision     Homemaking 20 Supervision ww      Functional Transfers / Balance:   Date Status DME  Comments   Sit Balance 7/15/20 Close S     Stand Balance 20 S ww    [] Tub  [x] Shower   Transfer 20 CGA ww     Commode   Transfer 20 S ww  grabb ar    Functional   Mobility 20 S No device    Other:  Bed>WC   7/15/20   CGA   ww      Functional Exercises / Activity:  Mr. Karin Andrade with 1# weight 2 X 5 sets with additional 2 X 5 to increase BUE forearm, wrist and  strength for independence with functional tasks. Schedule activity completed with fair+ skill requiring Min A to identify X 1 error and to correct. Free weight exercises 3 X 10 on all planes to increase BUE strength and endurance for independence with functional tasks. -3# R shoulder flexion, scapular protraction/retraction, elbow flexion, B supination/pronation  -2# L shoulder flexion, scapular protraction/retraction and elbow flexion    Pre handwriting task with focus on [address] and handwriting legibility- good skill earnestine'rocky Sensory / Neuromuscular Re-Education:      Cognitive Skills:   Status Comments   Problem   Solving good     Memory good     Sequencing good     Safety good      Visual Perception:    Education:  Pt educated on safety during functional mobility. [] Family teach completed on:    Pain Level: Additional Notes:       Patient has made good  progress during treatment sessions toward set goals.  Therapy emphasis to obtain goals:ADLs, transfers, mobility, strengthening, endurance and LUE ROM. [x] Continue with current OT Plan of care. [] Prepare for Discharge     DISCHARGE RECOMMENDATIONS  Recommended DME:    Post Discharge Care:   []Home Independently  []Home with 24hr Care / Supervision []Home with Partial Supervision []Home with Home Health OT []Home with Out Pt OT []Other: ___   Comments:         Time in Time out Tx Time Breakdown  Variance:   First Session  9:15 10:00 [x] Individual Tx- 45 Mins  [] Concurrent Tx -   [] Co-Tx -   [] Group Tx -   [] Time Missed -     Second Session 2:30 3:15 [x] Individual Tx- 45 Mins  [] Concurrent Tx -  [] Co-Tx -   [] Group Tx -   [] Time Missed -     Third Session    [] Individual Tx-   [] Concurrent Tx -  [] Co-Tx -   [] Group Tx -   [] Time Missed -         Total Tx Time 90 Mins       Geanie Quails   MACHUCA/L [id number]  I have read the above note and agree with the documentation.   Hanh Schmitt OTR/L [id number]

## 2020-07-23 NOTE — PROGRESS NOTES
Physical Therapy  Weekly Team Note   Evaluating Therapist: Sheeba Francis DPT [id number]    NAME: Richardson Guadarrama  ROOM: [id number]  DIAGNOSIS: Debility  PRECAUTIONS: Falls, wound vac   HPI/PROCUDRES: History obtained from outside hospital chart and patient. 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 He recently had a few week history of chest pain and SOB and was found to have a large diaphragmatic hernia. He underwent repair with mesh and lower pulmonary wedge and bullae resection with cryoablation and left chest tube placement on 6/3/2020 at [facility] in [place]. Post operative course was noted for ARLENE. He developed acute hypoxic respiratory failure on 6/5/2020 and was transferred to SICU requiring intubation. Respiratory cultures grew E. coli and H. Influenza. On 6/9/2020 he was found to have bowel herniation into thoracic cavity with perforation and stool draining from his chest tube. He went back to the OR for open redo diaphragmatic hernia repair and partial colectomy for ischemic transverse colon with stoma placement on 6/10/2020. He then became septic, found to be in septic shock. Per notes he was treated with antibiotics per ID recommendations, weaned off pressors and extubated. He then had an episode of ARF due to mucous plugging requiring intubation and bronchoscopy. He was again extubated on 6/12/2020. He was reintubated on 6/22 due to desaturations and chest xray showed opacification of the left hemithorax. He again underwent bronchoscopy showing mucous plugging. He improved after bronchoscopy and lavage and he was extubated on 6/25. Course noted for acute encephalopathy. He maintained a wound vac to his left thoracotomy site and an abdominal wound vac on the laparotomy site, changed every Tues, Thurs, and Sat, along with packing of the original chest tube site. He completed antibiotic course per ID on 7/1/2020.  He passed MBS on 7/6 and was started on a liquid diet, which was advanced to a soft diet on 7/9. Social:  Pt lives with his wife in a ranch style floor plan with 1 step and no rails to enter. 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  714/20 7/15 7/23 Comments Short Term Goals Long Term Goals    Bed Mobility  Rolling: SBA  Supine to sit: SBA  Sit to supine: SBA  Scooting: SBA Rolling SBA  Supine to sit SBA  Sit to supine SBA    Independent with all aspects   Supervision Independent   Transfers Sit to stand: CGA  Stand to sit: CGA  Stand pivot: CGA with [employer] Sit to stand SBA  Stand to sit SBA  Stand pivot CGA Sit <>stand independent   Stand pivot without device independent   Supervision Independent   Ambulation   20 feet with WW with CGA 75 feet x1 with ww -200 feet without device with supervision  Occasional unsteadiness with increased fatigue. Decreased endurance 100 feet with no AD with Supervision >200 feet with no AD with Independent   Wheel Chair Mobility NT NT       Car Transfers NT SBA Supervision  Verbal cues for proper hand placement to improve safety Supervision Independent   Stair negotiation: ascended and descended 4 steps with 2 rail with CGA - backward descending 4 steps 2 rails SBA 12 steps with unilateral rails with supervision   4 steps with 1 rail with Supervision  12 steps with 1 rail with Mod Independent   BLE ROM WFL WFL       BLE Strength 4+/5 4+/5 4+/5      Balance  Sitting: Supervision  Standing: CGA with [employer] Sitting Sup  Standing CGA without A.D       Date Family Teach Completed TBA TBA       Is additional Family Teaching Needed?   Y or N Y yes       Hindering Progress Weakness, debility debility       PT recommended ELOS 2-3 weeks 1 week Ready for discharge       Team's Discharge Plan  Discharge Friday 7/24/20 Discharge today 7/24/20      Therapist at Team Meeting  Jerel Nixon, PT, DPT [id number]   Jerel Nixon PT, DPT [id number]          Date: 7/15/20  Supporting factors:  Pt overall does well balance wise and strength wise. Barriers to discharge:  B wound vacs, fatigues quickly  Additional comments:  Pt requires rest breaks in between ex and functional activities. Decreased nausea. DME:  TBD  After Care:  Supervision     Javier Trejo [id number]    Date:  2020  Supporting factors:  Overall good balance and strength  Barriers to discharge:  Decreased endurance, wound vac  Additional comments:  Pt progressing well toward goals however  still demonstrates  endurance.   Pt may require supervision at times   DME:  None   After Care:   Home PT, Needs Kacey 77 d/t wound vac  Adilene Bio [id number]

## 2020-07-24 VITALS
HEART RATE: 97 BPM | RESPIRATION RATE: 18 BRPM | DIASTOLIC BLOOD PRESSURE: 74 MMHG | SYSTOLIC BLOOD PRESSURE: 126 MMHG | BODY MASS INDEX: 31.84 KG/M2 | TEMPERATURE: 97.8 F | WEIGHT: 202.9 LBS | OXYGEN SATURATION: 95 % | HEIGHT: 67 IN

## 2020-07-24 PROCEDURE — 97535 SELF CARE MNGMENT TRAINING: CPT

## 2020-07-24 PROCEDURE — 6370000000 HC RX 637 (ALT 250 FOR IP): Performed by: PHYSICAL MEDICINE & REHABILITATION

## 2020-07-24 PROCEDURE — 99239 HOSP IP/OBS DSCHRG MGMT >30: CPT | Performed by: PHYSICAL MEDICINE & REHABILITATION

## 2020-07-24 PROCEDURE — 6360000002 HC RX W HCPCS: Performed by: PHYSICAL MEDICINE & REHABILITATION

## 2020-07-24 PROCEDURE — 97530 THERAPEUTIC ACTIVITIES: CPT

## 2020-07-24 PROCEDURE — 94640 AIRWAY INHALATION TREATMENT: CPT

## 2020-07-24 RX ORDER — POTASSIUM CHLORIDE 20 MEQ/1
20 TABLET, EXTENDED RELEASE ORAL
Qty: 90 TABLET | Refills: 0 | Status: SHIPPED | OUTPATIENT
Start: 2020-07-24 | End: 2022-09-13

## 2020-07-24 RX ORDER — OXYCODONE HYDROCHLORIDE 5 MG/1
5 TABLET ORAL 2 TIMES DAILY PRN
Qty: 14 TABLET | Refills: 0 | Status: CANCELLED | OUTPATIENT
Start: 2020-07-24 | End: 2020-07-31

## 2020-07-24 RX ORDER — ATORVASTATIN CALCIUM 40 MG/1
40 TABLET, FILM COATED ORAL DAILY
Qty: 30 TABLET | Refills: 0 | Status: SHIPPED | OUTPATIENT
Start: 2020-07-25

## 2020-07-24 RX ORDER — OXYCODONE HYDROCHLORIDE 5 MG/1
5 TABLET ORAL 2 TIMES DAILY PRN
Qty: 14 TABLET | Refills: 0 | Status: SHIPPED | OUTPATIENT
Start: 2020-07-24 | End: 2020-07-31

## 2020-07-24 RX ORDER — AMLODIPINE BESYLATE 5 MG/1
5 TABLET ORAL DAILY
Qty: 30 TABLET | Refills: 0 | Status: ON HOLD | OUTPATIENT
Start: 2020-07-25 | End: 2022-09-15 | Stop reason: HOSPADM

## 2020-07-24 RX ORDER — ATORVASTATIN CALCIUM 40 MG/1
40 TABLET, FILM COATED ORAL DAILY
Qty: 30 TABLET | Refills: 0 | Status: CANCELLED | OUTPATIENT
Start: 2020-07-24

## 2020-07-24 RX ORDER — CITALOPRAM 10 MG/1
10 TABLET ORAL DAILY
Qty: 30 TABLET | Refills: 0 | Status: CANCELLED | OUTPATIENT
Start: 2020-07-24

## 2020-07-24 RX ORDER — CHLORTHALIDONE 25 MG/1
25 TABLET ORAL DAILY
Qty: 30 TABLET | Refills: 0 | Status: CANCELLED | OUTPATIENT
Start: 2020-07-24

## 2020-07-24 RX ORDER — CHLORTHALIDONE 25 MG/1
25 TABLET ORAL DAILY
Qty: 30 TABLET | Refills: 0 | Status: SHIPPED | OUTPATIENT
Start: 2020-07-25 | End: 2022-09-13

## 2020-07-24 RX ORDER — PANTOPRAZOLE SODIUM 40 MG/1
40 TABLET, DELAYED RELEASE ORAL
Qty: 30 TABLET | Refills: 0 | Status: CANCELLED | OUTPATIENT
Start: 2020-07-25

## 2020-07-24 RX ORDER — PANTOPRAZOLE SODIUM 40 MG/1
40 TABLET, DELAYED RELEASE ORAL
Qty: 30 TABLET | Refills: 0 | Status: SHIPPED | OUTPATIENT
Start: 2020-07-25 | End: 2022-09-13

## 2020-07-24 RX ORDER — CITALOPRAM 10 MG/1
10 TABLET ORAL DAILY
Qty: 30 TABLET | Refills: 0 | Status: SHIPPED | OUTPATIENT
Start: 2020-07-25 | End: 2022-09-13

## 2020-07-24 RX ORDER — AMLODIPINE BESYLATE 5 MG/1
5 TABLET ORAL DAILY
Qty: 30 TABLET | Refills: 0 | Status: CANCELLED | OUTPATIENT
Start: 2020-07-24

## 2020-07-24 RX ORDER — POTASSIUM CHLORIDE 20 MEQ/1
20 TABLET, EXTENDED RELEASE ORAL
Qty: 90 TABLET | Refills: 0 | Status: CANCELLED | OUTPATIENT
Start: 2020-07-24

## 2020-07-24 RX ADMIN — PANTOPRAZOLE SODIUM 40 MG: 40 TABLET, DELAYED RELEASE ORAL at 06:57

## 2020-07-24 RX ADMIN — OXYCODONE 5 MG: 5 TABLET ORAL at 06:57

## 2020-07-24 RX ADMIN — ARFORMOTEROL TARTRATE 15 MCG: 15 SOLUTION RESPIRATORY (INHALATION) at 07:40

## 2020-07-24 RX ADMIN — POTASSIUM CHLORIDE 20 MEQ: 20 TABLET, EXTENDED RELEASE ORAL at 11:29

## 2020-07-24 RX ADMIN — POTASSIUM CHLORIDE 20 MEQ: 20 TABLET, EXTENDED RELEASE ORAL at 09:24

## 2020-07-24 RX ADMIN — AMLODIPINE BESYLATE 5 MG: 5 TABLET ORAL at 09:23

## 2020-07-24 RX ADMIN — CHLORTHALIDONE 25 MG: 25 TABLET ORAL at 09:24

## 2020-07-24 RX ADMIN — CITALOPRAM 10 MG: 20 TABLET, FILM COATED ORAL at 09:23

## 2020-07-24 RX ADMIN — BUDESONIDE 500 MCG: 0.5 SUSPENSION RESPIRATORY (INHALATION) at 07:40

## 2020-07-24 RX ADMIN — ATORVASTATIN CALCIUM 40 MG: 40 TABLET, FILM COATED ORAL at 09:23

## 2020-07-24 RX ADMIN — ACETAMINOPHEN 650 MG: 325 TABLET ORAL at 00:23

## 2020-07-24 ASSESSMENT — PAIN DESCRIPTION - LOCATION: LOCATION: BACK

## 2020-07-24 ASSESSMENT — PAIN DESCRIPTION - PAIN TYPE: TYPE: CHRONIC PAIN

## 2020-07-24 ASSESSMENT — PAIN - FUNCTIONAL ASSESSMENT: PAIN_FUNCTIONAL_ASSESSMENT: PREVENTS OR INTERFERES SOME ACTIVE ACTIVITIES AND ADLS

## 2020-07-24 ASSESSMENT — PAIN DESCRIPTION - DESCRIPTORS: DESCRIPTORS: ACHING;DISCOMFORT;SORE

## 2020-07-24 ASSESSMENT — PAIN DESCRIPTION - ORIENTATION: ORIENTATION: LEFT

## 2020-07-24 ASSESSMENT — PAIN SCALES - GENERAL
PAINLEVEL_OUTOF10: 6
PAINLEVEL_OUTOF10: 1
PAINLEVEL_OUTOF10: 0
PAINLEVEL_OUTOF10: 7
PAINLEVEL_OUTOF10: 1

## 2020-07-24 ASSESSMENT — PAIN DESCRIPTION - PROGRESSION: CLINICAL_PROGRESSION: GRADUALLY IMPROVING

## 2020-07-24 ASSESSMENT — PAIN DESCRIPTION - ONSET: ONSET: AWAKENED FROM SLEEP

## 2020-07-24 ASSESSMENT — PAIN DESCRIPTION - FREQUENCY: FREQUENCY: INTERMITTENT

## 2020-07-24 NOTE — PROGRESS NOTES
Discharge instructions reviewed with patient and wife with both verbalizing understanding and did not have any further questions.

## 2020-07-24 NOTE — PROGRESS NOTES
diet, which was advanced to a soft diet on 7/9. Social:  Pt lives with his wife in a ranch style floor plan with 1 step and no rails to enter. 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 yes Yes      Does pt have pain? No c/o pain but reported nausea 4/10 abdomen and back pain       Bed Mobility  Rolling: SBA  Supine to sit: SBA  Sit to supine: SBA  Scooting: SBA Mod Ind  Supervision Independent   Transfers Sit to stand: CGA  Stand to sit: CGA  Stand pivot: CGA with [address] Sit to stand: Independent  Stand to sit: Independent  Stand pivot: Supervision  Supervision Independent   Ambulation   20 feet with WW with  feet x 3  without device with supervision   100 feet with no AD with Supervision >200 feet with no AD with Independent   Walking 10 feet on uneven surface 10 feet with WW with CGA 10 feet x1 without A.D. Supervision  10 feet with no AD with Supervision 10 feet with no AD with Independent   Wheel Chair Mobility NT NT      Car Transfers NT Supervision  Supervision Independent   Stair negotiation: ascended and descended 4 steps with 2 rail with CGA - backward descending 12 steps with 2 rails with Mod ind  4 steps with 1 rail with Supervision 4 steps with 1 rail with Mod Independent   Curb Step:   ascended and descended 7.5 inch step with [address] and CGA 7.5 inch step x4 without device with CGA  7.5 inch step with no AD and Supervision 7.5 inch step with no AD and Independent   Picking up object off the floor Picked up a cone with CGA NT  Will  an object with Supervision Will  an object with Independent   BLE ROM [facility] WFL      BLE Strength 4+/5 4+/5      Balance  Sitting: Supervision  Standing: CGA with [address] Sitting: Independent  Standing: Supervision with [address]      Date Family Teach Completed TBA TBA      Is additional Family Teaching Needed?   Y or N Y Yes Hindering Progress Weakness, debility Weakness, debility      PT recommended ELOS 2-3 weeks       Team's Discharge Plan        Therapist at Team Meeting          Therapeutic Exercise:   N/T   Additional Comments: Pt ambulated to/from PT gym without device withsupervision . Pt required frequent rest breaks due to some SOB and decreased endurance. Pt's wife performed hands on and did well with minimal cueing required. Continue to improve endurance and balance. Patient/family education  Pt education on safety with functional mobility    Patient/family response to education:   Pt/family verbalized understanding Pt/family demonstrated skill Pt/family requires further education in this area   x X with occasional verbal cues  x     AM  Time in: 1000  Time out: 1045        Pt is making good progress toward established Physical Therapy goals. Continue with physical therapy current plan of care.     Amanda Momin [id number]

## 2020-07-24 NOTE — PATIENT CARE CONFERENCE
[address] [place], [place] [address] [facility] ACUTE REHABILITATION  TEAM CONFERENCE NOTE/PATIENT PLAN OF CARE    Date: 2020  Admission date: 2020  Patient Name: Julianne De Santiago        MRN: [id number]    : 1965  (59 y.o.)  Gender: male   Rehab diagnosis/surgery with date:   Anoxic Encephalopathy/debility  Impairment Group Code:  2.1      MEDICAL/FUNCTIONAL HISTORY/STATUS:  patient ready for discharge, will go home with 1 wound VAC to left chest, wet to dry dressings to abdominal wound    Consultations/Labs/X-rays: BUN 14, Creatinine 1.6 on , glucose 97      MEDICATION UPDATE:  weaned off seroquel      NURSING FIMS:    Bowel:   Current level: colostomy    Bladder:   Current level: voiding without difficulty, using urinal and is continent      Skin integrity: see above   Pain: taking Roxicodone 5 mg for abdominal pain    NUTRITION    Diet  general  Liquid consistency   thin    SOCIAL INFORMATION:  Lives with: spouse  Prior community services:  none  Home Architecture:  ranch, 1 entry, no rails  Prior Level of function:  independent, 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  DME:  none    FAMILY / PATIENT EDUCATION:  wife has been in    PHYSICAL THERAPY    Bed mobility:   Current level: independent  Short term bed mobility goal: independent  Long term bed mobility goal: independent    Chair/bed transfers:  Current level: independent  Short term Chair/bed transfers goal: independent  Long term Chair/bed transfers goal: independent      Ambulation:   Current level: 200 ft no device at supervision, occasional  unsteadiness, no loss of balance  Short term ambulation goal: met  Long term ambulation goal: independent    Car transfers:   Current level: supervision, cues for hand placement  Short term car transfers goal: Modified independent  Long term car transfers goal:independent    Stairs:   Current level : 12 with 1 rail at supervision  Short term stairs goal: met  Long term stairs goal: 12 at Modified Mari Leavitt, DPT  Dora Yoon, OTR

## 2020-07-24 NOTE — PLAN OF CARE
Problem: Falls - Risk of:  Goal: Will remain free from falls  Description: Will remain free from falls  Outcome: Met This Shift  Goal: Absence of physical injury  Description: Absence of physical injury  Outcome: Met This Shift     Problem: Pain:  Goal: Pain level will decrease  Description: Pain level will decrease  Outcome: Met This Shift  Goal: Control of acute pain  Description: Control of acute pain  Outcome: Met This Shift  Goal: Control of chronic pain  Description: Control of chronic pain  Outcome: Met This Shift     Problem: Skin Integrity:  Goal: Will show no infection signs and symptoms  Description: Will show no infection signs and symptoms  Outcome: Met This Shift  Goal: Absence of new skin breakdown  Description: Absence of new skin breakdown  Outcome: Met This Shift     Problem: Bowel/Gastric:  Goal: Complications related to the disease process, condition or treatment will be avoided or minimized  Outcome: Met This Shift  Goal: Will be independent with ostomy care  Outcome: Met This Shift     Problem: SAFETY  Goal: Free from accidental physical injury  Outcome: Met This Shift     Problem: DAILY CARE  Goal: Daily care needs are met  Outcome: Met This Shift     Problem: SKIN INTEGRITY  Goal: Skin integrity is maintained or improved  Outcome: Met This Shift     Problem: KNOWLEDGE DEFICIT  Goal: Patient/S.O. demonstrates understanding of disease process, treatment plan, medications, and discharge instructions.   Outcome: Met This Shift     Problem: DISCHARGE BARRIERS  Goal: Patient's continuum of care needs are met  Outcome: Met This Shift

## 2020-07-24 NOTE — FLOWSHEET NOTE
Inpatient Wound Care (follow up) 5502A    Admit Date: 7/13/2020  6:38 PM    Reason for consult:  Wound vac, ostomy        Findings:      07/24/20 0911   Wound 07/13/20 Abdomen Mid;Proximal   Date First Assessed/Time First Assessed: 07/13/20 2130   Present on Hospital Admission: Yes  Primary Wound Type: Surgical Type  Location: Abdomen  Wound Location Orientation: Mid;Proximal   Dressing Changed Changed/New   Dressing/Treatment ABD; Moist to dry   Wound Cleansed Rinsed/Irrigated with saline   Wound Assessment Red;Yellow;Tan;Black   Drainage Amount Moderate   Drainage Description Yellow  (blue on outer kerlix packing)   Odor None   Alejandra-wound Assessment Intact   Wound 07/13/20 Chest Left;Lateral   Date First Assessed/Time First Assessed: 07/13/20 2130   Present on Hospital Admission: Yes  Location: Chest  Wound Location Orientation: Left;Lateral   Dressing Changed Changed/New   Dressing/Treatment ABD; Moist to dry   Wound Cleansed Rinsed/Irrigated with saline   Wound Assessment Red  (3 dark spot at wound bed)   Drainage Amount Small   Drainage Description Serosanguinous   Odor None   Alejandra-wound Assessment Intact   Colostomy RUQ Transverse   Placement Date: 06/09/20   Pre-existing: Yes  Location: RUQ  Colostomy Type: Transverse   Stomal Appliance 1 piece; Changed   Stoma  Assessment Red;Moist;Protrudes   Mucocutaneous Junction Separation   Treatment Bag change;Site care   Stool Appearance Soft   Stool Color Brown   Stool Amount Medium        Impression:  Left lateral chest  Mid abdomen  ostomy    Plan: pouch change  Moist to dry dressing Left lateral chest and abdomen        Melva Downs 7/24/2020 9:20 AM

## 2020-07-24 NOTE — PROGRESS NOTES
Physical Therapy  Discharge Summary  Evaluating Therapist: Ariana Hernandez DPT [id number]    NAME: Edel Lira  ROOM: [facility]  DIAGNOSIS: Debility  PRECAUTIONS: Falls, wound vac x2  HPI/PROCUDRES: History obtained from outside hospital chart and patient. 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 He recently had a few week history of chest pain and SOB and was found to have a large diaphragmatic hernia. He underwent repair with mesh and lower pulmonary wedge and bullae resection with cryoablation and left chest tube placement on 6/3/2020 at [facility] in [place]. Post operative course was noted for ARLENE. He developed acute hypoxic respiratory failure on 6/5/2020 and was transferred to SICU requiring intubation. Respiratory cultures grew E. coli and H. Influenza. On 6/9/2020 he was found to have bowel herniation into thoracic cavity with perforation and stool draining from his chest tube. He went back to the OR for open redo diaphragmatic hernia repair and partial colectomy for ischemic transverse colon with stoma placement on 6/10/2020. He then became septic, found to be in septic shock. Per notes he was treated with antibiotics per ID recommendations, weaned off pressors and extubated. He then had an episode of ARF due to mucous plugging requiring intubation and bronchoscopy. He was again extubated on 6/12/2020. He was reintubated on 6/22 due to desaturations and chest xray showed opacification of the left hemithorax. He again underwent bronchoscopy showing mucous plugging. He improved after bronchoscopy and lavage and he was extubated on 6/25. Course noted for acute encephalopathy. He maintained a wound vac to his left thoracotomy site and an abdominal wound vac on the laparotomy site, changed every Tues, Thurs, and Sat, along with packing of the original chest tube site. He completed antibiotic course per ID on 7/1/2020.  He passed MBS on 7/6 and was started on a liquid diet, which was advanced to a soft diet on 7/9. Social:  Pt lives with his wife in a ranch style floor plan with 1 step and no rails to enter. 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  714/20 As of 7/24/20   Short Term Goals Long Term Goals    Bed Mobility  Rolling: SBA  Supine to sit: SBA  Sit to supine: SBA  Scooting: SBA Modified Independent Supervision Independent   Transfers Sit to stand: CGA  Stand to sit: CGA  Stand pivot: CGA with [employer] Sit to stand: Independent  Stand to sit: Independent  Stand pivot: Independent Supervision Independent   Ambulation   20 feet with WW with  feet x2 reps with no AD Supervision  100 feet with no AD with Supervision >200 feet with no AD with Independent   Walking 10 feet on uneven surface 10 feet with WW with CGA 10 feet without device with Supervision 10 feet with no AD with Supervision 10 feet with no AD with Independent   Wheel Chair Mobility NT NT     Car Transfers NT Supervision Supervision Independent   Stair negotiation: ascended and descended 4 steps with 2 rail with CGA - backward descending 12 steps with 2 rails with Mod I 4 steps with 1 rail with Supervision 4 steps with 1 rail with Mod Independent   Curb Step:   ascended and descended 7.5 inch step with [employer] and CGA 7.5 inch step x4 without device with CGA 7.5 inch step with no AD and Supervision 7.5 inch step with no AD and Independent   Picking up object off the floor Picked up a cone with CGA NT Will  an object with Supervision Will  an object with Independent   BLE ROM [facility]     BLE Strength 4+/5 4+/5     Balance  Sitting: Supervision  Standing: CGA with [employer] Sitting: Independent  Standing: Supervision with no AD     Date Family Teach Completed TBA 7/24/20 wife     Is additional Family Teaching Needed?   Y or N Y N     Hindering Progress Weakness, debility Weakness, debility     PT recommended ELOS 2-3 weeks      Team's

## 2020-07-24 NOTE — DISCHARGE SUMMARY
[facility] Physical Medicine and Rehabilitation  Discharge Summary    Date of Rehab Admission:7/13/2020    Date of Discharge: 7/24/2020    Primary Care Physician:No primary care provider on file. Attending Physician: Magaly Beltrán MD  Primary Service:  Acute Inpatient Rehabilitation     Primary Diagnosis: Debility  Secondary Diagnosis/es: anoxic encephalopathy, s/p diaphragmatic hernia, ARLENE, s/p aspiration pneumonia, s/p bowel perforation, s/p colostomy, prolonged hospital stay, hyponatremia, hypokalemia, HTN     Consults:   Nephrology  Procedures:  None  Significant Radiologic Results:   None    Discharge Disposition:  Home  Condition on Discharge:  Stable. Functionally improved.  ===================================================================  History of Present Illness:     History obtained from outside hospital chart and patient. 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 He recently had a few week history of chest pain and SOB and was found to have a large diaphragmatic hernia. He underwent repair with mesh and lower pulmonary wedge and bullae resection with cryoablation and left chest tube placement on 6/3/2020 at [facility] in [place]. Post operative course was noted for ARLENE. He developed acute hypoxic respiratory failure on 6/5/2020 and was transferred to SICU requiring intubation. Respiratory cultures grew E. coli and H. Influenza. On 6/9/2020 he was found to have bowel herniation into thoracic cavity with perforation and stool draining from his chest tube. He went back to the OR for open redo diaphragmatic hernia repair and partial colectomy for ischemic transverse colon with stoma placement on 6/10/2020. He then became septic, found to be in septic shock. Per notes he was treated with antibiotics per ID recommendations, weaned off pressors and extubated.  He then had an episode of ARF due to mucous plugging requiring intubation and Homemaking   7/20/20   Supervision   ww               Discharge Physical Examination  General appearance: alert, NAD  Head: Normocephalic, without obvious abnormality, atraumatic  Eyes: conjunctivae/corneas clear. PERRL, EOM's intact. Lungs: No wheezes or rales. Diminished at left base. Heart: regular rate and rhythm, S1, S2 normal  Abdomen: soft, non-tender, normal bowel sounds. Extremities: extremities normal, atraumatic, no cyanosis or edema  Musculoskeletal: Range of motion normal and no gross abnormalities. Skin: No rashes.  Mid abdominal surgical wound, left lateral chest wound (old chest tube site), L lateral rib cage surgical wound, RUQ colostomy. Dressings dry and intact. Wound vac in place. Neurologic: AAOx4. Speech clear, content appropriate. Follows all commands. SILT throughout. Motor 5/5 throughout, no focal deficits. Hospital Course   Functional and mobility deficits secondary to debility after prolonged hospital stay:  The patient completed an intensive inpatient rehabilitation program including PT, OT, SLP and achieved significant improvement in function as documented above. Recommended continued therapies are documented below.      -Debility s/p diaphragmatic hernia repair complicated by ARLENE, aspiration pneumonia, recurrent prolonged intubation, bowel perforation, sepsis, colostomy, prolonged hospital stay: Completed Acute rehab program with functional progress as above. Continued wound dressing changes, wound vac management, per surgeons at [address] [place] [address], wound care followed during rehab stay.  -Acute anoxic encephalopathy: Cognitive status improved significantly. Speech therapy evaluated, but did not need ongoing follow up. -ARLENE: Nephrology following patient, was on normal saline at 50cc/hr--now dc'd. Cr stable. -Electrolyte imbalance- Hyponatremia, now resolved. Hypokalemia, on potassium supplement.   -HTN: Continue current antihypertensives --BP controlled ==================================================================  Discharge Medications     Medication List      START taking these medications    amLODIPine 5 MG tablet  Commonly known as:  NORVASC  Take 1 tablet by mouth daily  Start taking on:  July 25, 2020     atorvastatin 40 MG tablet  Commonly known as:  LIPITOR  Take 1 tablet by mouth daily  Start taking on:  July 25, 2020     chlorthalidone 25 MG tablet  Commonly known as:  HYGROTON  Take 1 tablet by mouth daily  Start taking on:  July 25, 2020     citalopram 10 MG tablet  Commonly known as:  CELEXA  Take 1 tablet by mouth daily  Start taking on:  July 25, 2020     fluticasone-salmeterol 250-50 MCG/DOSE Aepb  Commonly known as:  Advair Diskus  Inhale 1 puff into the lungs every 12 hours     oxyCODONE 5 MG immediate release tablet  Commonly known as:  ROXICODONE  Take 1 tablet by mouth 2 times daily as needed for Pain for up to 7 days. pantoprazole 40 MG tablet  Commonly known as:  PROTONIX  Take 1 tablet by mouth every morning (before breakfast)  Start taking on:  July 25, 2020     potassium chloride 20 MEQ extended release tablet  Commonly known as:  KLOR-CON M  Take 1 tablet by mouth 3 times daily (with meals)           Where to Get Your Medications      These medications were sent to [address] [place] [address], [address] [place], [place] [address]yusef [facility] [address]    Phone:  [phone]   · amLODIPine 5 MG tablet  · atorvastatin 40 MG tablet  · chlorthalidone 25 MG tablet  · citalopram 10 MG tablet  · fluticasone-salmeterol 250-50 MCG/DOSE Aepb  · pantoprazole 40 MG tablet  · potassium chloride 20 MEQ extended release tablet     You can get these medications from any pharmacy    Bring a paper prescription for each of these medications  · oxyCODONE 5 MG immediate release tablet         Allergies  Patient has no known allergies.     Recommended Therapies at Discharge: Home health PT, OT, nursing      Follow-Up  -Primary care  -Nephrology  -Surgery at [address] [facility] provided to the patient and appropriate family members regarding discharge medications and follow up     More than 30 minutes was spent in preparation of this patient's discharge including, but not limited to, examination, preparation of documents, prescription preparation, counseling and coordination.       Electronically signed by Mariah Shaw MD on 7/24/2020 at 9:31 AM

## 2020-07-24 NOTE — PROGRESS NOTES
Occupational Therapy  OCCUPATIONAL THERAPY DAILY NOTE/ Discharge Summary    Date:2020  Patient Name: Nathan Mccall  MRN: [id number]  : 1965  Room: [address] [place] [address]   Diagnosis: debility    Precautions: falls, abdominal precautions    Functional Assessment:   Date Status AE  Comments   Feeding 20 Independent     Grooming 20 Sup     Bathing 20 SBA     UB Dressing 20 Set up     LB Dressing 20 Supervision     Homemaking 20 Supervision ww      Functional Transfers / Balance:   Date Status DME  Comments   Sit Balance 7/15/20 Close S     Stand Balance 20 S ww    [] Tub  [x] Shower   Transfer 20 CGA ww     Commode   Transfer 20 S ww  grabb ar    Functional   Mobility 20 S No device    Other:  Bed>WC   7/15/20   CGA   ww      Functional Exercises / Activity:       Sensory / Neuromuscular Re-Education:      Cognitive Skills:   Status Comments   Problem   Solving good     Memory good     Sequencing good     Safety good      Visual Perception:    Education:  Pt educated on safety during functional mobility. [x] Family teach completed on: 20  Pt's wife present for family teach this date and observed pt completing toilet transfer, toileting, couch and kitchen chair without arms in functional living environment. Pt completes all transfers and tasks at S level. Wife educated on pt's level of assist for ADLs and transfers. Pain Level: Additional Notes:       Patient has made good  progress during treatment sessions toward set goals. Therapy emphasis to obtain goals:ADLs, transfers, mobility, strengthening, endurance and LUE ROM. [] Continue with current OT Plan of care. [x] Prepare for Discharge     DISCHARGE RECOMMENDATIONS   OCCUPATIONAL THERAPY DISCHARGE SUMMARY    Discontinue Occupational Therapy intervention. Pt has:   [] met all set goals. [x] made good progress toward set goals.    [] has made slow progress toward goals and would benefit from rehab setting change.  [] had a medical decline and therefore was transferred off the Rehab unit. Long term goals  Time Frame for Long term goals : 3 weeks  Long term goal 1: Pt will complete Self-Feeding Mod I  Long term goal 2: Pt will complete Hygiene/Grooming Mod I while standing at the sink w/ use of AD PRN  Long term goal 3: Pt will complete UB/LB Dressing/Bathing/Toileting Mod I  while seated/standing w/ use of DME/AD PRN  Long term goal 4: Pt will complete Toilet/Walk-in Shower Transfers Mod I w/ use of DME PRN  Long term goal 5: Pt will INDly demo Good Ax tolerance, Good Safety Awareness w/ completion of all Functional Ax    The Patient has received education on:  [x]Transfer Safety [x]Compensatory tech for ADLs [x]AE training [x]DME training [x]Energy Conservation [x]Home / Kitchen Safety  [x]Fall Prevention  []Other:    Family training was completed: yes    Recommendations: HH OT      Recommended DME:    Post Discharge Care:   []Home Independently  []Home with 24hr Care / Supervision []Home with Partial Supervision []Home with Home Health OT []Home with Out Pt OT []Other: ___   Comments:         Time in Time out Tx Time Breakdown  Variance:   First Session  9:30 9:45 [x] Individual Tx- 15 Mins  [] Concurrent Tx -   [] Co-Tx -   [] Group Tx -   [] Time Missed -     Second Session   [x] Individual Tx- 45 Mins  [] Concurrent Tx -  [] Co-Tx -   [] Group Tx -   [] Time Missed -     Third Session    [] Individual Tx-   [] Concurrent Tx -  [] Co-Tx -   [] Group Tx -   [] Time Missed -         Total Tx Time 15 Mins       Naoma Severance   MACHUCA/L [id number]  I have read the above note and agree with the documentation.   Hernan Nichole OTR/L [id number]

## 2020-11-03 ENCOUNTER — TELEPHONE (OUTPATIENT)
Dept: NEUROLOGY | Age: 55
End: 2020-11-03

## 2021-07-14 ENCOUNTER — HOSPITAL ENCOUNTER (OUTPATIENT)
Dept: HOSPITAL 83 - LAB | Age: 56
Discharge: HOME | End: 2021-07-14
Attending: INTERNAL MEDICINE
Payer: COMMERCIAL

## 2021-07-14 DIAGNOSIS — N18.9: Primary | ICD-10-CM

## 2021-07-14 LAB
ALBUMIN SERPL-MCNC: 3.3 GM/DL (ref 3.1–4.5)
BUN SERPL-MCNC: 21 MG/DL (ref 7–24)
CHLORIDE SERPL-SCNC: 103 MMOL/L (ref 98–107)
CREAT SERPL-MCNC: 1.3 MG/DL (ref 0.7–1.3)
POTASSIUM SERPL-SCNC: 3.1 MMOL/L (ref 3.5–5.1)
SODIUM SERPL-SCNC: 136 MMOL/L (ref 136–145)

## 2021-07-16 ENCOUNTER — HOSPITAL ENCOUNTER (OUTPATIENT)
Dept: HOSPITAL 83 - LAB | Age: 56
Discharge: HOME | End: 2021-07-16
Attending: FAMILY MEDICINE
Payer: COMMERCIAL

## 2021-07-16 DIAGNOSIS — R14.0: Primary | ICD-10-CM

## 2021-07-16 DIAGNOSIS — R60.0: ICD-10-CM

## 2021-07-16 LAB
ALBUMIN SERPL-MCNC: 3.4 GM/DL (ref 3.1–4.5)
ALP SERPL-CCNC: 124 U/L (ref 45–117)
ALT SERPL W P-5'-P-CCNC: 23 U/L (ref 12–78)
AST SERPL-CCNC: 23 IU/L (ref 3–35)
BUN SERPL-MCNC: 30 MG/DL (ref 7–24)
CHLORIDE SERPL-SCNC: 99 MMOL/L (ref 98–107)
CREAT SERPL-MCNC: 1.67 MG/DL (ref 0.7–1.3)
POTASSIUM SERPL-SCNC: 2.8 MMOL/L (ref 3.5–5.1)
PROT SERPL-MCNC: 8.5 GM/DL (ref 6.4–8.2)
SODIUM SERPL-SCNC: 137 MMOL/L (ref 136–145)

## 2022-08-25 ENCOUNTER — HOSPITAL ENCOUNTER (OUTPATIENT)
Dept: HOSPITAL 83 - LAB | Age: 57
Discharge: HOME | End: 2022-08-25
Attending: INTERNAL MEDICINE
Payer: COMMERCIAL

## 2022-08-25 DIAGNOSIS — J45.40: ICD-10-CM

## 2022-08-25 DIAGNOSIS — Z79.899: ICD-10-CM

## 2022-08-25 DIAGNOSIS — J30.89: ICD-10-CM

## 2022-08-25 DIAGNOSIS — G47.33: Primary | ICD-10-CM

## 2022-08-25 LAB
BASOPHILS # BLD AUTO: 0.1 10*3/UL (ref 0–0.1)
BASOPHILS NFR BLD AUTO: 0.8 % (ref 0–1)
EOSINOPHIL # BLD AUTO: 0.5 10*3/UL (ref 0–0.4)
EOSINOPHIL # BLD AUTO: 5.1 % (ref 1–4)
ERYTHROCYTE [DISTWIDTH] IN BLOOD BY AUTOMATED COUNT: 19.3 % (ref 0–14.5)
HCT VFR BLD AUTO: 36.7 % (ref 42–52)
LYMPHOCYTES # BLD AUTO: 3.1 10*3/UL (ref 1.3–4.4)
LYMPHOCYTES NFR BLD AUTO: 32.2 % (ref 27–41)
MCH RBC QN AUTO: 22.6 PG (ref 27–31)
MCHC RBC AUTO-ENTMCNC: 30.5 G/DL (ref 33–37)
MCV RBC AUTO: 74.1 FL (ref 80–94)
MONOCYTES # BLD AUTO: 1.4 10*3/UL (ref 0.1–1)
MONOCYTES NFR BLD MANUAL: 14.3 % (ref 3–9)
NEUT #: 4.5 10*3/UL (ref 2.3–7.9)
NEUT %: 47.4 % (ref 47–73)
NRBC BLD QL AUTO: 0 % (ref 0–0)
PLATELET # BLD AUTO: 445 10*3/UL (ref 130–400)
PMV BLD AUTO: 8.9 FL (ref 9.6–12.3)
RBC # BLD AUTO: 4.95 10*6/UL (ref 4.5–5.9)
WBC NRBC COR # BLD AUTO: 9.6 10*3/UL (ref 4.8–10.8)

## 2022-09-13 ENCOUNTER — APPOINTMENT (OUTPATIENT)
Dept: GENERAL RADIOLOGY | Age: 57
DRG: 192 | End: 2022-09-13
Payer: COMMERCIAL

## 2022-09-13 ENCOUNTER — HOSPITAL ENCOUNTER (INPATIENT)
Age: 57
LOS: 2 days | Discharge: HOME | DRG: 192 | End: 2022-09-15
Attending: EMERGENCY MEDICINE | Admitting: INTERNAL MEDICINE
Payer: COMMERCIAL

## 2022-09-13 DIAGNOSIS — B34.8 RHINOVIRUS INFECTION: ICD-10-CM

## 2022-09-13 DIAGNOSIS — J96.01 ACUTE RESPIRATORY FAILURE WITH HYPOXIA (HCC): Primary | ICD-10-CM

## 2022-09-13 DIAGNOSIS — J44.1 COPD EXACERBATION (HCC): ICD-10-CM

## 2022-09-13 PROBLEM — R06.89 DYSPNEA AND RESPIRATORY ABNORMALITIES: Status: ACTIVE | Noted: 2022-09-13

## 2022-09-13 PROBLEM — R06.00 DYSPNEA AND RESPIRATORY ABNORMALITIES: Status: ACTIVE | Noted: 2022-09-13

## 2022-09-13 PROBLEM — E78.5 HLD (HYPERLIPIDEMIA): Status: ACTIVE | Noted: 2022-09-13

## 2022-09-13 LAB
ADENOVIRUS BY PCR: NOT DETECTED
ALBUMIN SERPL-MCNC: 3.8 G/DL (ref 3.5–5.2)
ALP BLD-CCNC: 117 U/L (ref 40–129)
ALT SERPL-CCNC: 17 U/L (ref 0–40)
ANION GAP SERPL CALCULATED.3IONS-SCNC: 10 MMOL/L (ref 7–16)
AST SERPL-CCNC: 18 U/L (ref 0–39)
BASOPHILS ABSOLUTE: 0.08 E9/L (ref 0–0.2)
BASOPHILS RELATIVE PERCENT: 0.9 % (ref 0–2)
BILIRUB SERPL-MCNC: 0.4 MG/DL (ref 0–1.2)
BORDETELLA PARAPERTUSSIS BY PCR: NOT DETECTED
BORDETELLA PERTUSSIS BY PCR: NOT DETECTED
BUN BLDV-MCNC: 22 MG/DL (ref 6–20)
CALCIUM SERPL-MCNC: 9.2 MG/DL (ref 8.6–10.2)
CHLAMYDOPHILIA PNEUMONIAE BY PCR: NOT DETECTED
CHLORIDE BLD-SCNC: 108 MMOL/L (ref 98–107)
CO2: 24 MMOL/L (ref 22–29)
CORONAVIRUS 229E BY PCR: NOT DETECTED
CORONAVIRUS HKU1 BY PCR: NOT DETECTED
CORONAVIRUS NL63 BY PCR: NOT DETECTED
CORONAVIRUS OC43 BY PCR: NOT DETECTED
CREAT SERPL-MCNC: 1.3 MG/DL (ref 0.7–1.2)
EKG ATRIAL RATE: 82 BPM
EKG P AXIS: 68 DEGREES
EKG P-R INTERVAL: 164 MS
EKG Q-T INTERVAL: 376 MS
EKG QRS DURATION: 90 MS
EKG QTC CALCULATION (BAZETT): 439 MS
EKG R AXIS: 37 DEGREES
EKG T AXIS: 54 DEGREES
EKG VENTRICULAR RATE: 82 BPM
EOSINOPHILS ABSOLUTE: 0.33 E9/L (ref 0.05–0.5)
EOSINOPHILS RELATIVE PERCENT: 3.5 % (ref 0–6)
GFR AFRICAN AMERICAN: >60
GFR NON-AFRICAN AMERICAN: 57 ML/MIN/1.73
GLUCOSE BLD-MCNC: 107 MG/DL (ref 74–99)
HCT VFR BLD CALC: 35.6 % (ref 37–54)
HEMOGLOBIN: 10.8 G/DL (ref 12.5–16.5)
HUMAN METAPNEUMOVIRUS BY PCR: NOT DETECTED
HUMAN RHINOVIRUS/ENTEROVIRUS BY PCR: DETECTED
IMMATURE GRANULOCYTES #: 0.03 E9/L
IMMATURE GRANULOCYTES %: 0.3 % (ref 0–5)
INFLUENZA A BY PCR: NOT DETECTED
INFLUENZA B BY PCR: NOT DETECTED
LYMPHOCYTES ABSOLUTE: 2.04 E9/L (ref 1.5–4)
LYMPHOCYTES RELATIVE PERCENT: 21.8 % (ref 20–42)
MAGNESIUM: 2.1 MG/DL (ref 1.6–2.6)
MCH RBC QN AUTO: 22.3 PG (ref 26–35)
MCHC RBC AUTO-ENTMCNC: 30.3 % (ref 32–34.5)
MCV RBC AUTO: 73.6 FL (ref 80–99.9)
MONOCYTES ABSOLUTE: 1.21 E9/L (ref 0.1–0.95)
MONOCYTES RELATIVE PERCENT: 12.9 % (ref 2–12)
MYCOPLASMA PNEUMONIAE BY PCR: NOT DETECTED
NEUTROPHILS ABSOLUTE: 5.67 E9/L (ref 1.8–7.3)
NEUTROPHILS RELATIVE PERCENT: 60.6 % (ref 43–80)
PARAINFLUENZA VIRUS 1 BY PCR: NOT DETECTED
PARAINFLUENZA VIRUS 2 BY PCR: NOT DETECTED
PARAINFLUENZA VIRUS 3 BY PCR: NOT DETECTED
PARAINFLUENZA VIRUS 4 BY PCR: NOT DETECTED
PDW BLD-RTO: 19.8 FL (ref 11.5–15)
PLATELET # BLD: 364 E9/L (ref 130–450)
PMV BLD AUTO: 9.2 FL (ref 7–12)
POTASSIUM SERPL-SCNC: 4.2 MMOL/L (ref 3.5–5)
PRO-BNP: 67 PG/ML (ref 0–125)
PROCALCITONIN: 0.13 NG/ML (ref 0–0.08)
RBC # BLD: 4.84 E12/L (ref 3.8–5.8)
RESPIRATORY SYNCYTIAL VIRUS BY PCR: NOT DETECTED
SARS-COV-2, NAAT: NOT DETECTED
SARS-COV-2, PCR: NOT DETECTED
SODIUM BLD-SCNC: 142 MMOL/L (ref 132–146)
TOTAL PROTEIN: 7.4 G/DL (ref 6.4–8.3)
TROPONIN, HIGH SENSITIVITY: 14 NG/L (ref 0–11)
TROPONIN, HIGH SENSITIVITY: 17 NG/L (ref 0–11)
WBC # BLD: 9.4 E9/L (ref 4.5–11.5)

## 2022-09-13 PROCEDURE — 94664 DEMO&/EVAL PT USE INHALER: CPT

## 2022-09-13 PROCEDURE — 36415 COLL VENOUS BLD VENIPUNCTURE: CPT

## 2022-09-13 PROCEDURE — 6370000000 HC RX 637 (ALT 250 FOR IP): Performed by: STUDENT IN AN ORGANIZED HEALTH CARE EDUCATION/TRAINING PROGRAM

## 2022-09-13 PROCEDURE — 6360000002 HC RX W HCPCS: Performed by: STUDENT IN AN ORGANIZED HEALTH CARE EDUCATION/TRAINING PROGRAM

## 2022-09-13 PROCEDURE — 84145 PROCALCITONIN (PCT): CPT

## 2022-09-13 PROCEDURE — 85025 COMPLETE CBC W/AUTO DIFF WBC: CPT

## 2022-09-13 PROCEDURE — 80053 COMPREHEN METABOLIC PANEL: CPT

## 2022-09-13 PROCEDURE — 6370000000 HC RX 637 (ALT 250 FOR IP): Performed by: INTERNAL MEDICINE

## 2022-09-13 PROCEDURE — 1200000000 HC SEMI PRIVATE

## 2022-09-13 PROCEDURE — 71045 X-RAY EXAM CHEST 1 VIEW: CPT

## 2022-09-13 PROCEDURE — 6360000002 HC RX W HCPCS: Performed by: INTERNAL MEDICINE

## 2022-09-13 PROCEDURE — 94640 AIRWAY INHALATION TREATMENT: CPT

## 2022-09-13 PROCEDURE — 84484 ASSAY OF TROPONIN QUANT: CPT

## 2022-09-13 PROCEDURE — 87635 SARS-COV-2 COVID-19 AMP PRB: CPT

## 2022-09-13 PROCEDURE — 2580000003 HC RX 258: Performed by: INTERNAL MEDICINE

## 2022-09-13 PROCEDURE — 99285 EMERGENCY DEPT VISIT HI MDM: CPT

## 2022-09-13 PROCEDURE — 83735 ASSAY OF MAGNESIUM: CPT

## 2022-09-13 PROCEDURE — 96374 THER/PROPH/DIAG INJ IV PUSH: CPT

## 2022-09-13 PROCEDURE — 83880 ASSAY OF NATRIURETIC PEPTIDE: CPT

## 2022-09-13 PROCEDURE — 93005 ELECTROCARDIOGRAM TRACING: CPT | Performed by: STUDENT IN AN ORGANIZED HEALTH CARE EDUCATION/TRAINING PROGRAM

## 2022-09-13 PROCEDURE — 0202U NFCT DS 22 TRGT SARS-COV-2: CPT

## 2022-09-13 RX ORDER — POTASSIUM CHLORIDE 20 MEQ/1
20 TABLET, EXTENDED RELEASE ORAL DAILY
Status: DISCONTINUED | OUTPATIENT
Start: 2022-09-14 | End: 2022-09-15 | Stop reason: HOSPADM

## 2022-09-13 RX ORDER — MONTELUKAST SODIUM 10 MG/1
10 TABLET ORAL DAILY
COMMUNITY

## 2022-09-13 RX ORDER — METHYLPREDNISOLONE SODIUM SUCCINATE 40 MG/ML
40 INJECTION, POWDER, LYOPHILIZED, FOR SOLUTION INTRAMUSCULAR; INTRAVENOUS EVERY 12 HOURS
Status: COMPLETED | OUTPATIENT
Start: 2022-09-13 | End: 2022-09-15

## 2022-09-13 RX ORDER — SODIUM CHLORIDE 0.9 % (FLUSH) 0.9 %
5-40 SYRINGE (ML) INJECTION PRN
Status: DISCONTINUED | OUTPATIENT
Start: 2022-09-13 | End: 2022-09-15 | Stop reason: HOSPADM

## 2022-09-13 RX ORDER — SODIUM CHLORIDE FOR INHALATION 3 %
4 VIAL, NEBULIZER (ML) INHALATION 2 TIMES DAILY
Status: DISCONTINUED | OUTPATIENT
Start: 2022-09-13 | End: 2022-09-15 | Stop reason: HOSPADM

## 2022-09-13 RX ORDER — ONDANSETRON 4 MG/1
4 TABLET, ORALLY DISINTEGRATING ORAL EVERY 8 HOURS PRN
Status: DISCONTINUED | OUTPATIENT
Start: 2022-09-13 | End: 2022-09-15 | Stop reason: HOSPADM

## 2022-09-13 RX ORDER — ONDANSETRON 2 MG/ML
4 INJECTION INTRAMUSCULAR; INTRAVENOUS EVERY 6 HOURS PRN
Status: DISCONTINUED | OUTPATIENT
Start: 2022-09-13 | End: 2022-09-15 | Stop reason: HOSPADM

## 2022-09-13 RX ORDER — CITALOPRAM 20 MG/1
20 TABLET ORAL DAILY
COMMUNITY

## 2022-09-13 RX ORDER — CITALOPRAM 20 MG/1
20 TABLET ORAL DAILY
Status: DISCONTINUED | OUTPATIENT
Start: 2022-09-14 | End: 2022-09-15 | Stop reason: HOSPADM

## 2022-09-13 RX ORDER — AMLODIPINE BESYLATE 5 MG/1
5 TABLET ORAL DAILY
Status: DISCONTINUED | OUTPATIENT
Start: 2022-09-14 | End: 2022-09-14

## 2022-09-13 RX ORDER — MONTELUKAST SODIUM 10 MG/1
10 TABLET ORAL DAILY
Status: DISCONTINUED | OUTPATIENT
Start: 2022-09-14 | End: 2022-09-15 | Stop reason: HOSPADM

## 2022-09-13 RX ORDER — ATORVASTATIN CALCIUM 40 MG/1
40 TABLET, FILM COATED ORAL DAILY
Status: DISCONTINUED | OUTPATIENT
Start: 2022-09-14 | End: 2022-09-15 | Stop reason: HOSPADM

## 2022-09-13 RX ORDER — SODIUM CHLORIDE 9 MG/ML
INJECTION, SOLUTION INTRAVENOUS PRN
Status: DISCONTINUED | OUTPATIENT
Start: 2022-09-13 | End: 2022-09-15 | Stop reason: HOSPADM

## 2022-09-13 RX ORDER — IPRATROPIUM BROMIDE AND ALBUTEROL SULFATE 2.5; .5 MG/3ML; MG/3ML
3 SOLUTION RESPIRATORY (INHALATION) ONCE
Status: COMPLETED | OUTPATIENT
Start: 2022-09-13 | End: 2022-09-13

## 2022-09-13 RX ORDER — FLUTICASONE PROPIONATE AND SALMETEROL 500; 50 UG/1; UG/1
1 POWDER RESPIRATORY (INHALATION) EVERY 12 HOURS
COMMUNITY

## 2022-09-13 RX ORDER — ACETAMINOPHEN 650 MG/1
650 SUPPOSITORY RECTAL EVERY 6 HOURS PRN
Status: DISCONTINUED | OUTPATIENT
Start: 2022-09-13 | End: 2022-09-15 | Stop reason: HOSPADM

## 2022-09-13 RX ORDER — POTASSIUM CHLORIDE 20 MEQ/1
20 TABLET, EXTENDED RELEASE ORAL DAILY
COMMUNITY

## 2022-09-13 RX ORDER — PREDNISONE 20 MG/1
20 TABLET ORAL 2 TIMES DAILY
Status: DISCONTINUED | OUTPATIENT
Start: 2022-09-15 | End: 2022-09-15 | Stop reason: HOSPADM

## 2022-09-13 RX ORDER — BUDESONIDE 0.5 MG/2ML
500 INHALANT ORAL 2 TIMES DAILY
Status: DISCONTINUED | OUTPATIENT
Start: 2022-09-13 | End: 2022-09-15 | Stop reason: HOSPADM

## 2022-09-13 RX ORDER — ENOXAPARIN SODIUM 100 MG/ML
30 INJECTION SUBCUTANEOUS 2 TIMES DAILY
Status: DISCONTINUED | OUTPATIENT
Start: 2022-09-13 | End: 2022-09-15 | Stop reason: HOSPADM

## 2022-09-13 RX ORDER — METHYLPREDNISOLONE SODIUM SUCCINATE 125 MG/2ML
60 INJECTION, POWDER, LYOPHILIZED, FOR SOLUTION INTRAMUSCULAR; INTRAVENOUS ONCE
Status: COMPLETED | OUTPATIENT
Start: 2022-09-13 | End: 2022-09-13

## 2022-09-13 RX ORDER — IPRATROPIUM BROMIDE AND ALBUTEROL SULFATE 2.5; .5 MG/3ML; MG/3ML
1 SOLUTION RESPIRATORY (INHALATION)
Status: DISCONTINUED | OUTPATIENT
Start: 2022-09-13 | End: 2022-09-15 | Stop reason: HOSPADM

## 2022-09-13 RX ORDER — SODIUM CHLORIDE 0.9 % (FLUSH) 0.9 %
5-40 SYRINGE (ML) INJECTION EVERY 12 HOURS SCHEDULED
Status: DISCONTINUED | OUTPATIENT
Start: 2022-09-13 | End: 2022-09-15 | Stop reason: HOSPADM

## 2022-09-13 RX ORDER — FUROSEMIDE 10 MG/ML
20 INJECTION INTRAMUSCULAR; INTRAVENOUS 2 TIMES DAILY
Status: DISCONTINUED | OUTPATIENT
Start: 2022-09-13 | End: 2022-09-15 | Stop reason: HOSPADM

## 2022-09-13 RX ORDER — ACETAMINOPHEN 325 MG/1
650 TABLET ORAL EVERY 6 HOURS PRN
Status: DISCONTINUED | OUTPATIENT
Start: 2022-09-13 | End: 2022-09-15 | Stop reason: HOSPADM

## 2022-09-13 RX ORDER — ARFORMOTEROL TARTRATE 15 UG/2ML
15 SOLUTION RESPIRATORY (INHALATION) 2 TIMES DAILY
Status: DISCONTINUED | OUTPATIENT
Start: 2022-09-13 | End: 2022-09-15 | Stop reason: HOSPADM

## 2022-09-13 RX ADMIN — BUDESONIDE 500 MCG: 0.5 SUSPENSION RESPIRATORY (INHALATION) at 19:45

## 2022-09-13 RX ADMIN — IPRATROPIUM BROMIDE AND ALBUTEROL SULFATE 3 AMPULE: .5; 2.5 SOLUTION RESPIRATORY (INHALATION) at 11:13

## 2022-09-13 RX ADMIN — SODIUM CHLORIDE SOLN NEBU 3% 4 ML: 3 NEBU SOLN at 19:45

## 2022-09-13 RX ADMIN — Medication 10 ML: at 20:36

## 2022-09-13 RX ADMIN — FUROSEMIDE 20 MG: 10 INJECTION, SOLUTION INTRAMUSCULAR; INTRAVENOUS at 20:35

## 2022-09-13 RX ADMIN — ARFORMOTEROL TARTRATE 15 MCG: 15 SOLUTION RESPIRATORY (INHALATION) at 19:45

## 2022-09-13 RX ADMIN — IPRATROPIUM BROMIDE AND ALBUTEROL SULFATE 1 AMPULE: .5; 3 SOLUTION RESPIRATORY (INHALATION) at 19:45

## 2022-09-13 RX ADMIN — METHYLPREDNISOLONE SODIUM SUCCINATE 40 MG: 40 INJECTION, POWDER, FOR SOLUTION INTRAMUSCULAR; INTRAVENOUS at 23:47

## 2022-09-13 RX ADMIN — METHYLPREDNISOLONE SODIUM SUCCINATE 60 MG: 125 INJECTION, POWDER, FOR SOLUTION INTRAMUSCULAR; INTRAVENOUS at 11:21

## 2022-09-13 RX ADMIN — AZITHROMYCIN MONOHYDRATE 500 MG: 500 INJECTION, POWDER, LYOPHILIZED, FOR SOLUTION INTRAVENOUS at 20:36

## 2022-09-13 RX ADMIN — ENOXAPARIN SODIUM 30 MG: 100 INJECTION SUBCUTANEOUS at 20:35

## 2022-09-13 RX ADMIN — WATER 1000 MG: 1 INJECTION INTRAMUSCULAR; INTRAVENOUS; SUBCUTANEOUS at 20:35

## 2022-09-13 ASSESSMENT — ENCOUNTER SYMPTOMS
CONSTIPATION: 0
ABDOMINAL PAIN: 0
VOMITING: 0
COUGH: 0
COLOR CHANGE: 0
ABDOMINAL DISTENTION: 0
NAUSEA: 0
WHEEZING: 0
DIARRHEA: 0
STRIDOR: 0
SHORTNESS OF BREATH: 1
BACK PAIN: 0

## 2022-09-13 ASSESSMENT — PAIN - FUNCTIONAL ASSESSMENT: PAIN_FUNCTIONAL_ASSESSMENT: NONE - DENIES PAIN

## 2022-09-13 ASSESSMENT — LIFESTYLE VARIABLES: HOW OFTEN DO YOU HAVE A DRINK CONTAINING ALCOHOL: MONTHLY OR LESS

## 2022-09-13 NOTE — H&P
History and Physical      CHIEF COMPLAINT: Shortness of breath      HISTORY OF PRESENT ILLNESS:      The patient is a 61 y.o. male patient of no PCP on file history of COPD, hypertension, hyperlipidemia, depression who presents with shortness of breath. Patient notes 3 days of worsening shortness of breath. Associated with cough productive of yellow sputum. Patient feels that sputum is tougher to cough up which is similar to when he has had mucous plugging previously. He denies any fevers or chills no chest pain or palpitations. Shortness of breath this kane exertion. Improves with rest.  States he is using medications as prescribed. He is not smoking. Patient does note these been getting worsening edema in the lower extremities. He states this has been going on for the last month or longer. He does have sleep apnea states he is compliant with his CPAP at night. Past Medical History:    As above    Past Surgical History:    History reviewed. No pertinent surgical history. Medications Prior to Admission:    Reviewed see med rec    Allergies:    Patient has no known allergies. Social History:    reports that he has never smoked. His smokeless tobacco use includes snuff. He reports that he does not currently use alcohol. He reports that he does not use drugs. Family History:   pt denies contributory history     REVIEW OF SYSTEMS:  As above in the HPI, otherwise negative    PHYSICAL EXAM:    Vitals:  BP (!) 169/86   Pulse 88   Temp 98.4 °F (36.9 °C) (Oral)   Resp 20   Ht 5' 8\" (1.727 m)   Wt 235 lb (106.6 kg)   SpO2 94%   BMI 35.73 kg/m²     General:  Awake, alert, oriented X 3. Well developed, well nourished, well groomed. No apparent distress. HEENT:  Normocephalic, atraumatic. Pupils equal, round, reactive to light. No scleral icterus. No conjunctival injection. Normal lips, teeth, and gums. No nasal discharge.   Neck:  Supple  Heart:  RRR, no murmurs, gallops, rubs  Lungs: Expiratory wheezes bilaterally, bilat symmetrical expansion, + wheeze, , and occasional scattered rhonchi  Abdomen:   Bowel sounds present, soft, nontender, no masses, no organomegaly, no peritoneal signs  Extremities:  No clubbing, cyanosis, 1+ edema  Skin:  Warm and dry, no open lesions or rash  Neuro:  Cranial nerves 2-12 intact, no focal deficits  Breast: deferred  Rectal: deferred  Genitalia:  deferred    LABS:    CBC with Differential:    Lab Results   Component Value Date/Time    WBC 9.4 09/13/2022 10:58 AM    RBC 4.84 09/13/2022 10:58 AM    HGB 10.8 09/13/2022 10:58 AM    HCT 35.6 09/13/2022 10:58 AM     09/13/2022 10:58 AM    MCV 73.6 09/13/2022 10:58 AM    MCH 22.3 09/13/2022 10:58 AM    MCHC 30.3 09/13/2022 10:58 AM    RDW 19.8 09/13/2022 10:58 AM    METASPCT 0.9 07/16/2020 07:54 AM    LYMPHOPCT 21.8 09/13/2022 10:58 AM    MONOPCT 12.9 09/13/2022 10:58 AM    MYELOPCT 0.9 07/15/2020 04:39 AM    BASOPCT 0.9 09/13/2022 10:58 AM    MONOSABS 1.21 09/13/2022 10:58 AM    LYMPHSABS 2.04 09/13/2022 10:58 AM    EOSABS 0.33 09/13/2022 10:58 AM    BASOSABS 0.08 09/13/2022 10:58 AM     CMP:    Lab Results   Component Value Date/Time     09/13/2022 10:58 AM    K 4.2 09/13/2022 10:58 AM    K 3.4 07/23/2020 07:28 AM     09/13/2022 10:58 AM    CO2 24 09/13/2022 10:58 AM    BUN 22 09/13/2022 10:58 AM    CREATININE 1.3 09/13/2022 10:58 AM    GFRAA >60 09/13/2022 10:58 AM    LABGLOM 57 09/13/2022 10:58 AM    GLUCOSE 107 09/13/2022 10:58 AM    PROT 7.4 09/13/2022 10:58 AM    LABALBU 3.8 09/13/2022 10:58 AM    CALCIUM 9.2 09/13/2022 10:58 AM    BILITOT 0.4 09/13/2022 10:58 AM    ALKPHOS 117 09/13/2022 10:58 AM    AST 18 09/13/2022 10:58 AM    ALT 17 09/13/2022 10:58 AM     Magnesium:    Lab Results   Component Value Date/Time    MG 2.1 09/13/2022 10:58 AM     Phosphorus:    Lab Results   Component Value Date/Time    PHOS 3.2 07/14/2020 07:01 PM     PT/INR:    Lab Results   Component Value Date/Time    PROTIME 13.2 07/14/2020 10:03 AM    INR 1.2 07/14/2020 10:03 AM     Last 3 Troponin:  No results found for: TROPONINI  U/A:    Lab Results   Component Value Date/Time    COLORU Yellow 07/14/2020 01:25 PM    PROTEINU TRACE 07/14/2020 01:25 PM    PHUR 5.5 07/14/2020 01:25 PM    WBCUA 0-1 07/14/2020 01:25 PM    RBCUA 1-3 07/14/2020 01:25 PM    BACTERIA MODERATE 07/14/2020 01:25 PM    CLARITYU Clear 07/14/2020 01:25 PM    SPECGRAV 1.015 07/14/2020 01:25 PM    LEUKOCYTESUR Negative 07/14/2020 01:25 PM    UROBILINOGEN 0.2 07/14/2020 01:25 PM    BILIRUBINUR Negative 07/14/2020 01:25 PM    BLOODU SMALL 07/14/2020 01:25 PM    GLUCOSEU Negative 07/14/2020 01:25 PM     ABG:  No results found for: PH, PCO2, PO2, HCO3, BE, THGB, TCO2, O2SAT  HgBA1c:  No results found for: LABA1C  FLP:  No results found for: TRIG, HDL, LDLCALC, LDLDIRECT, LABVLDL  TSH:  No results found for: TSH    XR CHEST PORTABLE   Final Result   1. Interstitial opacities bilaterally could suggest mild pulmonary vascular   congestion or peribronchial inflammation. 2. Elevated left hemidiaphragm with adjacent opacities suggesting compressive   atelectasis or pneumonia. Short-term follow-up may be helpful for further   evaluation. ASSESSMENT:      Principal Problem:    COPD exacerbation ([address]  Active Problems:    Dyspnea and respiratory abnormalities    HLD (hyperlipidemia)    MAYRA (obstructive sleep apnea)    Essential hypertension  Resolved Problems:    * No resolved hospital problems. *      PLAN:    Admit  IV steroids--> p.o.   Nebulizers duo nebs, Pulmicort, arformoterol, 3% saline  EZ Pap  IV ABX  proCalcitonin   some concern for CHF/volume overload based on peripheral edema on exam and pulmonary edema on chest x-ray, however BNP is low  IV Lasix-20 mg twice daily, monitor electrolytes and volume status  Cpap hs  Medications for other co morbidities cont as appropriate w dosage adjustments as necessary  PT/OT  DVT PPx  DC planning        Electronically signed by Anthony Srivastava MD on 9/13/2022 at 1:17 PM    NOTE: This report was transcribed using voice recognition software. Every effort was made to ensure accuracy; however, inadvertent computerized transcription errors may be present.

## 2022-09-13 NOTE — PROGRESS NOTES
Patient wears a home CPAP and a order is placed to have him bring his home unit. Patient does not have his CPAP with him. States he can have someone bring it in tomorrow.

## 2022-09-13 NOTE — LETTER
[address]  Phone: [phone]    No name on file. September 15, 2022     Patient: Richard Fontanez   YOB: 1965   Date of Visit: 9/13/2022       To Whom It May Concern: The patient was admitted to our hospital on 9/13 and discharged on 9/15. It is my medical opinion that Richard Fontanez may return to work on September 19, 2022. If you have any questions or concerns, please don't hesitate to call.     Sincerely,        Sophie Benavides MD

## 2022-09-14 LAB
ANION GAP SERPL CALCULATED.3IONS-SCNC: 10 MMOL/L (ref 7–16)
BUN BLDV-MCNC: 27 MG/DL (ref 6–20)
CALCIUM SERPL-MCNC: 9.1 MG/DL (ref 8.6–10.2)
CHLORIDE BLD-SCNC: 106 MMOL/L (ref 98–107)
CO2: 24 MMOL/L (ref 22–29)
CREAT SERPL-MCNC: 1.5 MG/DL (ref 0.7–1.2)
GFR AFRICAN AMERICAN: 58
GFR NON-AFRICAN AMERICAN: 48 ML/MIN/1.73
GLUCOSE BLD-MCNC: 145 MG/DL (ref 74–99)
LV EF: 60 %
LVEF MODALITY: NORMAL
POTASSIUM REFLEX MAGNESIUM: 4.5 MMOL/L (ref 3.5–5)
PROCALCITONIN: 0.11 NG/ML (ref 0–0.08)
SODIUM BLD-SCNC: 140 MMOL/L (ref 132–146)

## 2022-09-14 PROCEDURE — 97165 OT EVAL LOW COMPLEX 30 MIN: CPT

## 2022-09-14 PROCEDURE — 1200000000 HC SEMI PRIVATE

## 2022-09-14 PROCEDURE — 6370000000 HC RX 637 (ALT 250 FOR IP): Performed by: INTERNAL MEDICINE

## 2022-09-14 PROCEDURE — 6360000004 HC RX CONTRAST MEDICATION: Performed by: INTERNAL MEDICINE

## 2022-09-14 PROCEDURE — 2580000003 HC RX 258: Performed by: INTERNAL MEDICINE

## 2022-09-14 PROCEDURE — 36415 COLL VENOUS BLD VENIPUNCTURE: CPT

## 2022-09-14 PROCEDURE — 80048 BASIC METABOLIC PNL TOTAL CA: CPT

## 2022-09-14 PROCEDURE — 94640 AIRWAY INHALATION TREATMENT: CPT

## 2022-09-14 PROCEDURE — 93306 TTE W/DOPPLER COMPLETE: CPT

## 2022-09-14 PROCEDURE — 84145 PROCALCITONIN (PCT): CPT

## 2022-09-14 PROCEDURE — 6360000002 HC RX W HCPCS: Performed by: INTERNAL MEDICINE

## 2022-09-14 PROCEDURE — 94664 DEMO&/EVAL PT USE INHALER: CPT

## 2022-09-14 RX ORDER — AMLODIPINE BESYLATE 10 MG/1
10 TABLET ORAL DAILY
Status: DISCONTINUED | OUTPATIENT
Start: 2022-09-14 | End: 2022-09-15 | Stop reason: HOSPADM

## 2022-09-14 RX ORDER — CHLORTHALIDONE 25 MG/1
25 TABLET ORAL DAILY
Status: DISCONTINUED | OUTPATIENT
Start: 2022-09-14 | End: 2022-09-15 | Stop reason: HOSPADM

## 2022-09-14 RX ORDER — MAGNESIUM HYDROXIDE/ALUMINUM HYDROXICE/SIMETHICONE 120; 1200; 1200 MG/30ML; MG/30ML; MG/30ML
30 SUSPENSION ORAL EVERY 6 HOURS PRN
Status: DISCONTINUED | OUTPATIENT
Start: 2022-09-14 | End: 2022-09-15 | Stop reason: HOSPADM

## 2022-09-14 RX ADMIN — SODIUM CHLORIDE SOLN NEBU 3% 4 ML: 3 NEBU SOLN at 20:09

## 2022-09-14 RX ADMIN — ONDANSETRON 4 MG: 2 INJECTION INTRAMUSCULAR; INTRAVENOUS at 17:58

## 2022-09-14 RX ADMIN — CHLORTHALIDONE 25 MG: 25 TABLET ORAL at 11:25

## 2022-09-14 RX ADMIN — WATER 1000 MG: 1 INJECTION INTRAMUSCULAR; INTRAVENOUS; SUBCUTANEOUS at 17:02

## 2022-09-14 RX ADMIN — IPRATROPIUM BROMIDE AND ALBUTEROL SULFATE 1 AMPULE: .5; 3 SOLUTION RESPIRATORY (INHALATION) at 08:12

## 2022-09-14 RX ADMIN — IPRATROPIUM BROMIDE AND ALBUTEROL SULFATE 1 AMPULE: .5; 3 SOLUTION RESPIRATORY (INHALATION) at 12:10

## 2022-09-14 RX ADMIN — MONTELUKAST 10 MG: 10 TABLET, FILM COATED ORAL at 09:31

## 2022-09-14 RX ADMIN — ENOXAPARIN SODIUM 30 MG: 100 INJECTION SUBCUTANEOUS at 09:31

## 2022-09-14 RX ADMIN — METHYLPREDNISOLONE SODIUM SUCCINATE 40 MG: 40 INJECTION, POWDER, FOR SOLUTION INTRAMUSCULAR; INTRAVENOUS at 11:25

## 2022-09-14 RX ADMIN — PERFLUTREN 1.65 MG: 6.52 INJECTION, SUSPENSION INTRAVENOUS at 14:01

## 2022-09-14 RX ADMIN — BUDESONIDE 500 MCG: 0.5 SUSPENSION RESPIRATORY (INHALATION) at 08:12

## 2022-09-14 RX ADMIN — FUROSEMIDE 20 MG: 10 INJECTION, SOLUTION INTRAMUSCULAR; INTRAVENOUS at 17:02

## 2022-09-14 RX ADMIN — ALUMINUM HYDROXIDE, MAGNESIUM HYDROXIDE, AND SIMETHICONE 30 ML: 200; 200; 20 SUSPENSION ORAL at 22:12

## 2022-09-14 RX ADMIN — ARFORMOTEROL TARTRATE 15 MCG: 15 SOLUTION RESPIRATORY (INHALATION) at 20:08

## 2022-09-14 RX ADMIN — BUDESONIDE 500 MCG: 0.5 SUSPENSION RESPIRATORY (INHALATION) at 20:08

## 2022-09-14 RX ADMIN — ATORVASTATIN CALCIUM 40 MG: 40 TABLET, FILM COATED ORAL at 09:31

## 2022-09-14 RX ADMIN — Medication 10 ML: at 20:43

## 2022-09-14 RX ADMIN — ENOXAPARIN SODIUM 30 MG: 100 INJECTION SUBCUTANEOUS at 20:39

## 2022-09-14 RX ADMIN — AMLODIPINE BESYLATE 10 MG: 10 TABLET ORAL at 09:31

## 2022-09-14 RX ADMIN — AZITHROMYCIN MONOHYDRATE 500 MG: 500 INJECTION, POWDER, LYOPHILIZED, FOR SOLUTION INTRAVENOUS at 17:03

## 2022-09-14 RX ADMIN — FUROSEMIDE 20 MG: 10 INJECTION, SOLUTION INTRAMUSCULAR; INTRAVENOUS at 09:31

## 2022-09-14 RX ADMIN — METHYLPREDNISOLONE SODIUM SUCCINATE 40 MG: 40 INJECTION, POWDER, FOR SOLUTION INTRAMUSCULAR; INTRAVENOUS at 22:13

## 2022-09-14 RX ADMIN — Medication 10 ML: at 09:32

## 2022-09-14 RX ADMIN — POTASSIUM CHLORIDE 20 MEQ: 1500 TABLET, EXTENDED RELEASE ORAL at 09:32

## 2022-09-14 RX ADMIN — SODIUM CHLORIDE SOLN NEBU 3% 4 ML: 3 NEBU SOLN at 08:13

## 2022-09-14 RX ADMIN — CITALOPRAM 20 MG: 20 TABLET, FILM COATED ORAL at 09:31

## 2022-09-14 RX ADMIN — IPRATROPIUM BROMIDE AND ALBUTEROL SULFATE 1 AMPULE: .5; 3 SOLUTION RESPIRATORY (INHALATION) at 16:22

## 2022-09-14 RX ADMIN — IPRATROPIUM BROMIDE AND ALBUTEROL SULFATE 1 AMPULE: .5; 3 SOLUTION RESPIRATORY (INHALATION) at 20:09

## 2022-09-14 RX ADMIN — ARFORMOTEROL TARTRATE 15 MCG: 15 SOLUTION RESPIRATORY (INHALATION) at 08:11

## 2022-09-14 ASSESSMENT — PAIN SCALES - GENERAL: PAINLEVEL_OUTOF10: 0

## 2022-09-14 NOTE — PROGRESS NOTES
[address] [id number] [address]    Date:2022                                                  Patient Name: Najma Mcfarland    MRN: [id number]    : 1965    Room: [address] [place] [address]      Evaluating OT: Tracie Melendez OTR/KRISTIN, [id number]    Referring Taylor Avelar MD  Specific Provider Orders/Date: OT Eval and Treat 22    Diagnosis: COPD exacerbation ([address] [J44.1]  Rhinovirus infection [B34.8]  Acute respiratory failure with hypoxia ([address] [J96.01]   Surgery: NA     Pertinent Medical History:    History reviewed. No pertinent past medical history. History reviewed. No pertinent surgical history. Precautions:  Fall Risk    Assessment of current deficits    [] Functional mobility  []ADLs  [] Strength               []Cognition    [] Functional transfers   [] IADLs         [] Safety Awareness   []Endurance    [] Fine Coordination              [] Balance      [] Vision/perception   []Sensation     []Gross Motor Coordination  [] ROM  [] Delirium                   [] Motor Control     OT PLAN OF CARE   OT POC based on physician orders, patient diagnosis and results of clinical assessment    Frequency/Duration:  Patient has no further OT needs at this time. Will be discharged from OT caseload. Please reorder if there is a change in functional status. Recommended Adaptive Equipment: None at this time    Home Living: Pt lives with wife in a 1 story with no step(s) to enter and no rail(s); bed/bath on main. Laundry in basement with full flight and 1 rail/  Bathroom setup: walk-in shower, elevated toilet  Equipment owned: none    Prior Level of Function: independent with ADLs and with IADLs; using no device for functional mobility.    Driving: yes  Occupation: dialysis nurse    Pain Level: none  Cognition: A&O: 4/4; Follows 3 step directions   Memory: G   Sequencing: G   Problem solving: G   Judgement/safety: G     Functional Assessment: AM-PAC Daily Activity Raw Score: 24/24   Initial Eval Status  Date: 9/14/22     Feeding Independent      Grooming Independent      UB Dressing Independent      LB Dressing Independent   To [place]/doff socks EOB     Bathing Independent      Toileting Independent      Bed Mobility  Rolling: Independent   Supine to sit: Independent   Sit to supine: Independent      Functional Transfers Sit to stand: Independent   Stand to sit: Independent      Functional Mobility Independent   For in-room distance     Balance Sitting:     Static:  Independent     Dynamic:Independent   Standing: Independent      Endurance/Activity Tolerance good     Visual/  Perceptual Glasses: yes   -readers           Hand Dominance R   AROM (PROM) Strength Additional Info:    RUE  WFL 5/5 good  and wfl FMC/dexterity noted during ADL tasks   LUE WFL 5/5 good  and wfl FMC/dexterity noted during ADL tasks       Hearing: WFL  Sensation:  No c/o numbness or tingling  Tone: WNL  Edema: unremarkable                            Comments: Upon arrival patient supine with HOB slightly elevated. LB dressing, bed mobility, functional mobility, functional transfers addressed. After session, patient long sitting with all devices within reach, all lines and tubes intact. Pt required cues and education as noted above for safe facilitation and completion of tasks. Therapist provided skilled monitoring of patient's response during treatment session. Prior to and at the end of session, environmental modifications/line management completed for patients safety and efficiency of treatment session. Overall, patient demonstrates minimal to no difficulties with completion of BADLs and IADLs. Factors contributing to these difficulties include decreased endurance, and generalized weakness. As noted above, patient is not in need of further OT services at this time.     Eval Complexity:   Low  Complexity    Treatment: Evaluation only    Patient and/or family were instructed on functional diagnosis, prognosis/goals and OT plan of care. Demonstrated good understanding. Eval Complexity: Low      Time In:1016  Time Out: 1025  Total Time:9 minutes    Min Units   OT Eval Low 97165  X 1    OT Eval Medium 00117      OT Eval High 85577       OT Re-Eval [id number]       Therapeutic Ex 01435       Therapeutic Activities 37317       ADL/Self Care 13847       Orthotic Management 37563       Neuro Re-Ed 34293       Non-Billable Time          Evaluation Time additionally includes thorough review of current medical information, gathering information on past medical history/social history and prior level of function, completion of standardized testing/informal observation of tasks, assessment of data and education on plan of care and goals.     Michela Amos, OTR/L  [id number]

## 2022-09-14 NOTE — CARE COORDINATION
Case Management- Spoke to patient via phone d/t isolation. A+Ox3. Admitted from the ER with SOB. (+) Rhinovirus. On IV Lasix, Solumedrol, Rocephin and Zithromax. Was on [email or web address] but now on room air. Lives at home with his wife in a one floor plan. Drives. Is an RN and still works. Completely Ind. Uses a CPAP at hs for MAYRA and is compliant. He reports no needs at discharge.  Arabella Potter N  [phone]

## 2022-09-14 NOTE — PROGRESS NOTES
Hospitalist Progress Note      SYNOPSIS: Patient admitted on 2022 for worsening cough, shortness of breath, sputum production. The patient is a 61 y.o. male patient of no PCP on file history of COPD, hypertension, hyperlipidemia, depression who presents with shortness of breath. Patient notes 3 days of worsening shortness of breath. Associated with cough productive of yellow sputum. Patient feels that sputum is tougher to cough up which is similar to when he has had mucous plugging previously. SUBJECTIVE:    Patient seen and examined in his room. States that shortness of breath improved. Denies any chest pain, nausea, vomiting. Records reviewed. Stable overnight. No other overnight issues reported. Temp (24hrs), Av.2 °F (36.8 °C), Min:97.9 °F (36.6 °C), Max:98.4 °F (36.9 °C)    DIET: ADULT DIET; Regular; Low Fat/Low Chol/High Fiber/FLORI  CODE: Full Code  No intake or output data in the 24 hours ending 22 0853    OBJECTIVE:    BP (!) 156/95   Pulse 93   Temp 97.9 °F (36.6 °C) (Oral)   Resp 18   Ht 5' 8\" (1.727 m)   Wt 235 lb (106.6 kg)   SpO2 95%   BMI 35.73 kg/m²     General appearance: No apparent distress, appears stated age and cooperative. HEENT:  Conjunctivae/corneas clear. Neck: Supple. No jugular venous distention. Respiratory: Clear to auscultation bilaterally, normal respiratory effort. Mild expiratory wheeze noted. Cardiovascular: Regular rate rhythm, normal S1-S2  Abdomen: Soft, nontender, nondistended  Musculoskeletal: No clubbing, cyanosis, no bilateral lower extremity edema. Brisk capillary refill.    Skin:  No rashes  on visible skin  Neurologic: awake, alert and following commands     ASSESSMENT & PLAN:      ASSESSMENT:       Principal Problem:    COPD exacerbation ([address]  Active Problems:    Dyspnea and respiratory abnormalities    HLD (hyperlipidemia)    MAYRA (obstructive sleep apnea)    Essential hypertension, poorly controlled  Resolved Problems:    * No resolved hospital problems. *        PLAN:     Admit  IV steroids--> p.o. Nebulizers duo nebs, Pulmicort, arformoterol, 3% saline  EZ Pap  IV ABX, azithromycin and ceftriaxone started on 9/13  proCalcitonin   some concern for CHF/volume overload based on peripheral edema on exam and pulmonary edema on chest x-ray, however BNP is low  IV Lasix-20 mg twice daily, monitor electrolytes and volume status  Cpap hs  Medications for other co morbidities cont as appropriate w dosage adjustments as necessary  PT/OT  DVT PPx  DC planning  Increased amlodipine to 10 mg p.o. daily and added chlorthalidone 25 mg p.o. daily    DISPOSITION:   Possible discharge in the next 24 to 48 hours.     Medications:  REVIEWED DAILY    Infusion Medications    sodium chloride       Scheduled Medications    amLODIPine  5 mg Oral Daily    atorvastatin  40 mg Oral Daily    citalopram  20 mg Oral Daily    Arformoterol Tartrate  15 mcg Nebulization BID    budesonide  500 mcg Nebulization BID    montelukast  10 mg Oral Daily    potassium chloride  20 mEq Oral Daily    furosemide  20 mg IntraVENous BID    sodium chloride (Inhalant)  4 mL Nebulization BID    sodium chloride flush  5-40 mL IntraVENous 2 times per day    enoxaparin  30 mg SubCUTAneous BID    ipratropium-albuterol  1 ampule Inhalation Q4H WA    methylPREDNISolone  40 mg IntraVENous Q12H    Followed by    Judith Linn ON 9/15/2022] predniSONE  20 mg Oral BID    cefTRIAXone (ROCEPHIN) IV  1,000 mg IntraVENous Q24H    azithromycin  500 mg IntraVENous Q24H     PRN Meds: sodium chloride flush, sodium chloride, ondansetron **OR** ondansetron, magnesium hydroxide, acetaminophen **OR** acetaminophen, perflutren lipid microspheres    Labs:     Recent Labs     09/13/22  1058   WBC 9.4   HGB 10.8*   HCT 35.6*          Recent Labs     09/13/22  1058 09/14/22  0655    140   K 4.2 4.5   * 106   CO2 24 24   BUN 22* 27*   CREATININE 1.3* 1.5*   CALCIUM 9.2 9.1       Recent

## 2022-09-15 ENCOUNTER — APPOINTMENT (OUTPATIENT)
Dept: GENERAL RADIOLOGY | Age: 57
DRG: 192 | End: 2022-09-15
Payer: COMMERCIAL

## 2022-09-15 VITALS
HEIGHT: 68 IN | SYSTOLIC BLOOD PRESSURE: 141 MMHG | TEMPERATURE: 97 F | DIASTOLIC BLOOD PRESSURE: 70 MMHG | BODY MASS INDEX: 35.61 KG/M2 | OXYGEN SATURATION: 92 % | HEART RATE: 83 BPM | RESPIRATION RATE: 18 BRPM | WEIGHT: 235 LBS

## 2022-09-15 LAB
ALBUMIN SERPL-MCNC: 4.2 G/DL (ref 3.5–5.2)
ALP BLD-CCNC: 114 U/L (ref 40–129)
ALT SERPL-CCNC: 17 U/L (ref 0–40)
ANION GAP SERPL CALCULATED.3IONS-SCNC: 20 MMOL/L (ref 7–16)
AST SERPL-CCNC: 19 U/L (ref 0–39)
BILIRUB SERPL-MCNC: 0.4 MG/DL (ref 0–1.2)
BUN BLDV-MCNC: 35 MG/DL (ref 6–20)
CALCIUM SERPL-MCNC: 9.8 MG/DL (ref 8.6–10.2)
CHLORIDE BLD-SCNC: 98 MMOL/L (ref 98–107)
CO2: 26 MMOL/L (ref 22–29)
CREAT SERPL-MCNC: 1.4 MG/DL (ref 0.7–1.2)
GFR AFRICAN AMERICAN: >60
GFR NON-AFRICAN AMERICAN: 52 ML/MIN/1.73
GLUCOSE BLD-MCNC: 132 MG/DL (ref 74–99)
HCT VFR BLD CALC: 40.5 % (ref 37–54)
HEMOGLOBIN: 12.5 G/DL (ref 12.5–16.5)
MCH RBC QN AUTO: 22.9 PG (ref 26–35)
MCHC RBC AUTO-ENTMCNC: 30.9 % (ref 32–34.5)
MCV RBC AUTO: 74 FL (ref 80–99.9)
PDW BLD-RTO: 19.8 FL (ref 11.5–15)
PLATELET # BLD: 437 E9/L (ref 130–450)
PMV BLD AUTO: 9.7 FL (ref 7–12)
POTASSIUM SERPL-SCNC: 4.3 MMOL/L (ref 3.5–5)
RBC # BLD: 5.47 E12/L (ref 3.8–5.8)
SODIUM BLD-SCNC: 144 MMOL/L (ref 132–146)
TOTAL PROTEIN: 8.2 G/DL (ref 6.4–8.3)
WBC # BLD: 15.8 E9/L (ref 4.5–11.5)

## 2022-09-15 PROCEDURE — 94640 AIRWAY INHALATION TREATMENT: CPT

## 2022-09-15 PROCEDURE — 6370000000 HC RX 637 (ALT 250 FOR IP): Performed by: INTERNAL MEDICINE

## 2022-09-15 PROCEDURE — 85027 COMPLETE CBC AUTOMATED: CPT

## 2022-09-15 PROCEDURE — 80053 COMPREHEN METABOLIC PANEL: CPT

## 2022-09-15 PROCEDURE — 6360000002 HC RX W HCPCS: Performed by: INTERNAL MEDICINE

## 2022-09-15 PROCEDURE — 2580000003 HC RX 258: Performed by: INTERNAL MEDICINE

## 2022-09-15 PROCEDURE — 36415 COLL VENOUS BLD VENIPUNCTURE: CPT

## 2022-09-15 PROCEDURE — 71045 X-RAY EXAM CHEST 1 VIEW: CPT

## 2022-09-15 RX ORDER — PREDNISONE 10 MG/1
10 TABLET ORAL DAILY
Qty: 5 TABLET | Refills: 0 | Status: SHIPPED | OUTPATIENT
Start: 2022-09-15 | End: 2022-09-20

## 2022-09-15 RX ORDER — AMLODIPINE BESYLATE 10 MG/1
10 TABLET ORAL DAILY
Qty: 30 TABLET | Refills: 3 | Status: SHIPPED | OUTPATIENT
Start: 2022-09-15

## 2022-09-15 RX ORDER — CEFUROXIME AXETIL 500 MG/1
500 TABLET ORAL 2 TIMES DAILY
Qty: 6 TABLET | Refills: 0 | Status: SHIPPED | OUTPATIENT
Start: 2022-09-15 | End: 2022-09-18

## 2022-09-15 RX ADMIN — SODIUM CHLORIDE SOLN NEBU 3% 4 ML: 3 NEBU SOLN at 09:35

## 2022-09-15 RX ADMIN — CITALOPRAM 20 MG: 20 TABLET, FILM COATED ORAL at 10:11

## 2022-09-15 RX ADMIN — ONDANSETRON 4 MG: 2 INJECTION INTRAMUSCULAR; INTRAVENOUS at 04:37

## 2022-09-15 RX ADMIN — MONTELUKAST 10 MG: 10 TABLET, FILM COATED ORAL at 10:11

## 2022-09-15 RX ADMIN — FUROSEMIDE 20 MG: 10 INJECTION, SOLUTION INTRAMUSCULAR; INTRAVENOUS at 10:12

## 2022-09-15 RX ADMIN — POTASSIUM CHLORIDE 20 MEQ: 1500 TABLET, EXTENDED RELEASE ORAL at 10:11

## 2022-09-15 RX ADMIN — CHLORTHALIDONE 25 MG: 25 TABLET ORAL at 10:11

## 2022-09-15 RX ADMIN — Medication 10 ML: at 10:12

## 2022-09-15 RX ADMIN — METHYLPREDNISOLONE SODIUM SUCCINATE 40 MG: 40 INJECTION, POWDER, FOR SOLUTION INTRAMUSCULAR; INTRAVENOUS at 10:11

## 2022-09-15 RX ADMIN — ATORVASTATIN CALCIUM 40 MG: 40 TABLET, FILM COATED ORAL at 10:11

## 2022-09-15 RX ADMIN — IPRATROPIUM BROMIDE AND ALBUTEROL SULFATE 1 AMPULE: .5; 3 SOLUTION RESPIRATORY (INHALATION) at 09:34

## 2022-09-15 RX ADMIN — ARFORMOTEROL TARTRATE 15 MCG: 15 SOLUTION RESPIRATORY (INHALATION) at 09:35

## 2022-09-15 RX ADMIN — AMLODIPINE BESYLATE 10 MG: 10 TABLET ORAL at 10:11

## 2022-09-15 RX ADMIN — BUDESONIDE 500 MCG: 0.5 SUSPENSION RESPIRATORY (INHALATION) at 09:34

## 2022-09-15 NOTE — CARE COORDINATION
9/15, Discharge acknowledged. Patient will discharge to home. IV Lasix Rocephin, Zithromax and Solumedrol will be discontinued. Per rounds no needs identified. Patient to discharge home with his wife. Patient will have family for transportation. SW to follow for further discharge planning needs.       PEYTON Trivedi  [facility] Case Management  [phone]

## 2022-09-15 NOTE — DISCHARGE SUMMARY
Hospitalist Discharge Summary    Patient ID: Yenni Mayfield   Patient : 1965  Patient's PCP: No primary care provider on file. Admit Date: 2022   Admitting Physician: Steph Coker MD    Discharge Date:  9/15/2022   Discharge Physician: Chon Rosales MD   Discharge Condition: Stable  Discharge Disposition: [facility] course in brief:  (Please refer to daily progress notes for a comprehensive review of the hospitalization by requesting medical records)    Patient admitted on 2022 for worsening cough, shortness of breath, sputum production. The patient is a 61 y.o. male patient of no PCP on file history of COPD, hypertension, hyperlipidemia, depression who presents with shortness of breath. Patient notes 3 days of worsening shortness of breath. Associated with cough productive of yellow sputum. Patient feels that sputum is tougher to cough up which is similar to when he has had mucous plugging previously. Patient respiratory panel came back positive for rhinovirus. However because of elevated procalcitonin he was continued on IV ceftriaxone and azithromycin. Received 3 doses of azithromycin before discharge. Discharged on Ceftin for 3 more days to complete 5-day treatment for possible bronchitis/pneumonia. Increased amlodipine 5 mg to 10 mg p.o. daily to better control the blood pressure. Discharged home on prednisone 10 mg p.o. daily for 5 days. Consults:   IP CONSULT TO INTERNAL MEDICINE  IP CONSULT TO SOCIAL WORK    Discharge Diagnoses:  Principal Problem:    COPD exacerbation ([facility]  Active Problems:    Dyspnea and respiratory abnormalities    HLD (hyperlipidemia)    MYARA (obstructive sleep apnea)    Essential hypertension, poorly controlled  Resolved Problems:    * No resolved hospital problems. *        PLAN:     Admit  IV steroids--> p.o.   Nebulizers duo nebs, Pulmicort, arformoterol, 3% saline  EZ Pap  IV ABX, azithromycin and ceftriaxone started on 9/13  proCalcitonin   some concern for CHF/volume overload based on peripheral edema on exam and pulmonary edema on chest x-ray, however BNP is low  IV Lasix-20 mg twice daily, monitor electrolytes and volume status  Cpap hs  Medications for other co morbidities cont as appropriate w dosage adjustments as necessary  PT/OT  DVT PPx  DC planning  Increased amlodipine to 10 mg p.o. daily and added chlorthalidone 25 mg p.o. daily      Discharge Instructions / Follow up:    No future appointments. Continued appropriate risk factor modification of blood pressure, diabetes and serum lipids will remain essential to reducing risk of future atherosclerotic development    Activity: activity as tolerated    Significant labs:  CBC:   Recent Labs     09/13/22  1058 09/15/22  0731   WBC 9.4 15.8*   RBC 4.84 5.47   HGB 10.8* 12.5   HCT 35.6* 40.5   MCV 73.6* 74.0*   RDW 19.8* 19.8*    437     BMP:   Recent Labs     09/13/22  1058 09/14/22  0655    140   K 4.2 4.5   * 106   CO2 24 24   BUN 22* 27*   CREATININE 1.3* 1.5*   MG 2.1  --      LFT:  Recent Labs     09/13/22  1058   PROT 7.4   ALKPHOS 117   ALT 17   AST 18   BILITOT 0.4     PT/INR: No results for input(s): INR, APTT in the last 72 hours. BNP: No results for input(s): BNP in the last 72 hours.   Hgb A1C: No results found for: LABA1C  Folate and B12: No results found for: UTEQBPWB76, No results found for: FOLATE  Thyroid Studies: No results found for: TSH, M8HPRUA, Q5RLZWM, THYROIDAB    Urinalysis:    Lab Results   Component Value Date/Time    NITRU Negative 07/14/2020 01:25 PM    WBCUA 0-1 07/14/2020 01:25 PM    BACTERIA MODERATE 07/14/2020 01:25 PM    RBCUA 1-3 07/14/2020 01:25 PM    BLOODU SMALL 07/14/2020 01:25 PM    SPECGRAV 1.015 07/14/2020 01:25 PM    GLUCOSEU Negative 07/14/2020 01:25 PM       Imaging:  Echo Complete    Result Date: 9/14/2022  Transthoracic Echocardiography Report (TTE)  Demographics   Patient Name    Helen Lira      Gender Male                  [facility] [facility] [address] Record  [id number]      Room Number       5262  Number   Account #       [de-identified]     Procedure Date    09/14/2022   Corporate ID                  Ordering          Olivia Madden MD                                Physician   Accession       [id number]    Referring  Number                        Physician   Date of Birth   1965    [address]   Age             61 year(s)    Interpreting      [address]                                Physician         Physician Cardiology                                                  Payal Bussin DO                                 Any Other  Procedure Type of Study   TTE procedure:Echo Complete W/ Dop & Color Flow. Procedure Date Date: 09/14/2022 Start: 01:27 PM Study Location: Portable Technical Quality: Limited visualization due to body habitus. Indications:Congestive heart failure. Patient Status: Routine Contrast Medium: Definity. Height: 68 inches Weight: 235 pounds BSA: 2.19 m^2 BMI: 35.73 kg/m^2 HR: 77 bpm BP: 156/95 mmHg  Findings   Left Ventricle  Ejection fraction is visually estimated at 60%. No regional wall motion  abnormalities seen. Left ventricle size is normal. Normal left ventricular  wall thickness. Normal left ventricular diastolic filling pattern for age. Right Ventricle  Normal right ventricle structure and function. Left Atrium  Left atrial volume index of 34 ml per meters squared BSA. Right Atrium  Mildly enlarged right atrium size. Mitral Valve  Mild thickening of the mitral valve leaflets. No evidence of mitral valve  stenosis. Physiologic and/or trace mitral regurgitation is present. Tricuspid Valve  The tricuspid valve appears structurally normal.  Physiologic and/or trace tricuspid regurgitation. Aortic Valve  Individual aortic valve leaflets are not clearly visualized. No  hemodynamically significant aortic stenosis is present.  No evidence of  aortic valve regurgitation. Pulmonic Valve  The pulmonic valve was not well visualized. Pericardial Effusion  No evidence for hemodynamically significant pericardial effusion. Aorta  Normal aortic root and ascending aorta. Miscellaneous  The inferior vena cava diameter is normal with normal respiratory  variation. Conclusions   Summary  Technically difficult study. Ejection fraction is visually estimated at 60%. No regional wall motion abnormalities seen. Normal right ventricle structure and function. Left atrial volume index of 34 ml per meters squared BSA. Physiologic and/or trace mitral regurgitation is present. No evidence for hemodynamically significant pericardial effusion.    Signature   ----------------------------------------------------------------  Electronically signed by Keira Morrison DO(Interpreting  physician) on 09/14/2022 03:52 PM  ----------------------------------------------------------------  M-Mode/2D Measurements & Calculations   LV Diastolic   LV Systolic Dimension: 2.7   AV Cusp Separation: 1.3 cmLA  Dimension: 4   cm                           Dimension: 3.9 cmAO Root  cm             LV Volume Diastolic: 35.5 ml Dimension: 2.7 cm  LV FS:32.5 %   LV Volume Systolic: 49.7 ml  LV PW          LV EDV/LV EDV Index: 51.4  Diastolic: 1   GS/17 BU/N^1GI ESV/LV ESV  cm             Index: 27.2 ml/12ml/ m^2     RV Diastolic Dimension: 4.3 cm  LV PW          EF Calculated: 02.2 %  Systolic: 1.8  LV Mass Index: 54 l/min*m^2  LA/Aorta: 1.44  cm                                          Ascending Aorta: 3 cm  Septum                                      LA volume/Index: 44.5 ml  Diastolic: 0.9 LVOT: 2.3 cm                 /33.99ml/m^2  cm                                          RA Area: 24.2 cm^2  Septum  Systolic: 1 cm                              IVC Expiration: 1.3 cm  CO: 7.23 l/min  CI: 3.3  l/m*m^2  LV Mass:  118.23 g  Doppler Measurements & Calculations   MV Peak E-Wave:    AV Peak Velocity: 1.49 m/s hemidiaphragm with adjacent opacities suggesting compressive atelectasis or pneumonia. Short-term follow-up may be helpful for further evaluation. Discharge Medications:      Medication List        START taking these medications      cefUROXime 500 MG tablet  Commonly known as: CEFTIN  Take 1 tablet by mouth 2 times daily for 3 days     predniSONE 10 MG tablet  Commonly known as: DELTASONE  Take 1 tablet by mouth daily for 5 days            CHANGE how you take these medications      amLODIPine 10 MG tablet  Commonly known as: NORVASC  Take 1 tablet by mouth daily  What changed:   medication strength  how much to take            CONTINUE taking these medications      atorvastatin 40 MG tablet  Commonly known as: LIPITOR  Take 1 tablet by mouth daily     citalopram 20 MG tablet  Commonly known as: CELEXA     fluticasone-salmeterol 500-50 MCG/ACT Aepb diskus inhaler  Commonly known as: ADVAIR     montelukast 10 MG tablet  Commonly known as: SINGULAIR     potassium chloride 20 MEQ extended release tablet  Commonly known as: KLOR-CON M               Where to Get Your Medications        These medications were sent to [facility] [address] [address], [place] [address]      Phone: [phone]   amLODIPine 10 MG tablet  cefUROXime 500 MG tablet  predniSONE 10 MG tablet         Time Spent on discharge is more than 45 minutes in the examination, evaluation, counseling and review of medications and discharge plan.    +++++++++++++++++++++++++++++++++++++++++++++++++  MD REMINGTON Dow/ Placido Loza [address] [place] [address]  +++++++++++++++++++++++++++++++++++++++++++++++++  NOTE: This report was transcribed using voice recognition software. Every effort was made to ensure accuracy; however, inadvertent computerized transcription errors may be present.

## 2022-09-21 NOTE — PROGRESS NOTES
Unknown  -- Refer to Clinical Documentation Reviewer    PROVIDER RESPONSE TEXT:    Acute hypoxic Respiratory Failure has been ruled out after study.     Query created by: Corina Bowling on 9/20/2022 11:05 PM      Electronically signed by:  Tawnya Clinton MD 9/21/2022 12:20 PM

## 2023-02-07 ENCOUNTER — HOSPITAL ENCOUNTER (OUTPATIENT)
Dept: HOSPITAL 83 - SDC | Age: 58
Discharge: HOME | End: 2023-02-07
Attending: INTERNAL MEDICINE
Payer: COMMERCIAL

## 2023-02-07 VITALS — SYSTOLIC BLOOD PRESSURE: 126 MMHG | DIASTOLIC BLOOD PRESSURE: 63 MMHG

## 2023-02-07 VITALS — DIASTOLIC BLOOD PRESSURE: 67 MMHG | SYSTOLIC BLOOD PRESSURE: 103 MMHG

## 2023-02-07 VITALS — DIASTOLIC BLOOD PRESSURE: 82 MMHG

## 2023-02-07 VITALS — DIASTOLIC BLOOD PRESSURE: 74 MMHG

## 2023-02-07 VITALS — HEIGHT: 67.99 IN | BODY MASS INDEX: 34.4 KG/M2 | WEIGHT: 227 LBS

## 2023-02-07 DIAGNOSIS — G47.33: ICD-10-CM

## 2023-02-07 DIAGNOSIS — J44.9: ICD-10-CM

## 2023-02-07 DIAGNOSIS — J30.89: ICD-10-CM

## 2023-02-07 DIAGNOSIS — R05.3: Primary | ICD-10-CM

## 2023-02-07 DIAGNOSIS — J45.50: ICD-10-CM

## 2023-02-08 LAB — SPECIMEN PREPARATION: (no result)

## 2023-05-10 ENCOUNTER — HOSPITAL ENCOUNTER (OUTPATIENT)
Dept: CT IMAGING | Age: 58
Discharge: HOME | End: 2023-05-12
Payer: COMMERCIAL

## 2023-05-10 ENCOUNTER — HOSPITAL ENCOUNTER (OUTPATIENT)
Dept: GENERAL RADIOLOGY | Age: 58
Discharge: HOME | End: 2023-05-12
Payer: COMMERCIAL

## 2023-05-10 VITALS
HEART RATE: 72 BPM | DIASTOLIC BLOOD PRESSURE: 66 MMHG | OXYGEN SATURATION: 95 % | TEMPERATURE: 97.4 F | RESPIRATION RATE: 18 BRPM | SYSTOLIC BLOOD PRESSURE: 114 MMHG

## 2023-05-10 DIAGNOSIS — R91.1 LUNG NODULE: ICD-10-CM

## 2023-05-10 LAB
ANION GAP SERPL CALCULATED.3IONS-SCNC: 8 MMOL/L (ref 7–16)
BUN SERPL-MCNC: 20 MG/DL (ref 6–20)
CALCIUM SERPL-MCNC: 9.1 MG/DL (ref 8.6–10.2)
CHLORIDE SERPL-SCNC: 106 MMOL/L (ref 98–107)
CO2 SERPL-SCNC: 25 MMOL/L (ref 22–29)
CREAT SERPL-MCNC: 1.2 MG/DL (ref 0.7–1.2)
ERYTHROCYTE [DISTWIDTH] IN BLOOD BY AUTOMATED COUNT: 17.4 FL (ref 11.5–15)
GLUCOSE SERPL-MCNC: 96 MG/DL (ref 74–99)
HCT VFR BLD AUTO: 37.7 % (ref 37–54)
HGB BLD-MCNC: 11.6 G/DL (ref 12.5–16.5)
INR BLD: 1.1
MCH RBC QN AUTO: 24.6 PG (ref 26–35)
MCHC RBC AUTO-ENTMCNC: 30.8 % (ref 32–34.5)
MCV RBC AUTO: 79.9 FL (ref 80–99.9)
PLATELET # BLD AUTO: 465 E9/L (ref 130–450)
PMV BLD AUTO: 9.7 FL (ref 7–12)
POTASSIUM SERPL-SCNC: 4.1 MMOL/L (ref 3.5–5)
PROTHROMBIN TIME: 11.9 SEC (ref 9.3–12.4)
RBC # BLD AUTO: 4.72 E12/L (ref 3.8–5.8)
REASON FOR REJECTION: NORMAL
REJECTED TEST: NORMAL
SODIUM SERPL-SCNC: 139 MMOL/L (ref 132–146)
WBC # BLD: 8.5 E9/L (ref 4.5–11.5)

## 2023-05-10 PROCEDURE — 80048 BASIC METABOLIC PNL TOTAL CA: CPT

## 2023-05-10 PROCEDURE — 6360000002 HC RX W HCPCS: Performed by: RADIOLOGY

## 2023-05-10 PROCEDURE — 2500000003 HC RX 250 WO HCPCS: Performed by: RADIOLOGY

## 2023-05-10 PROCEDURE — 71045 X-RAY EXAM CHEST 1 VIEW: CPT

## 2023-05-10 PROCEDURE — 32408 CORE NDL BX LNG/MED PERQ: CPT

## 2023-05-10 PROCEDURE — 7100000010 HC PHASE II RECOVERY - FIRST 15 MIN

## 2023-05-10 PROCEDURE — 85027 COMPLETE CBC AUTOMATED: CPT

## 2023-05-10 PROCEDURE — 7100000011 HC PHASE II RECOVERY - ADDTL 15 MIN

## 2023-05-10 PROCEDURE — 36415 COLL VENOUS BLD VENIPUNCTURE: CPT

## 2023-05-10 PROCEDURE — 85610 PROTHROMBIN TIME: CPT

## 2023-05-10 RX ORDER — BUMETANIDE 1 MG/1
1 TABLET ORAL DAILY
COMMUNITY

## 2023-05-10 RX ORDER — FENTANYL CITRATE 50 UG/ML
INJECTION, SOLUTION INTRAMUSCULAR; INTRAVENOUS PRN
Status: COMPLETED | OUTPATIENT
Start: 2023-05-10 | End: 2023-05-10

## 2023-05-10 RX ORDER — MIDAZOLAM HYDROCHLORIDE 2 MG/2ML
INJECTION, SOLUTION INTRAMUSCULAR; INTRAVENOUS PRN
Status: COMPLETED | OUTPATIENT
Start: 2023-05-10 | End: 2023-05-10

## 2023-05-10 RX ORDER — LIDOCAINE HYDROCHLORIDE 20 MG/ML
INJECTION, SOLUTION INFILTRATION; PERINEURAL PRN
Status: COMPLETED | OUTPATIENT
Start: 2023-05-10 | End: 2023-05-10

## 2023-05-10 RX ORDER — SPIRONOLACTONE 25 MG/1
25 TABLET ORAL DAILY
COMMUNITY

## 2023-05-10 RX ADMIN — MIDAZOLAM HYDROCHLORIDE 1 MG: 1 INJECTION, SOLUTION INTRAMUSCULAR; INTRAVENOUS at 11:23

## 2023-05-10 RX ADMIN — FENTANYL CITRATE 50 MCG: 50 INJECTION, SOLUTION INTRAMUSCULAR; INTRAVENOUS at 11:23

## 2023-05-10 RX ADMIN — LIDOCAINE HYDROCHLORIDE 10 ML: 20 INJECTION, SOLUTION INFILTRATION; PERINEURAL at 11:28

## 2023-05-10 NOTE — PROGRESS NOTES
1200 Patient returned from procedure. Dressing checked, clean, dry, and intact. Patient stable. No s/s of complications noted or reported. Vitals will be checked q 15min, see flow sheets. 1230  ls remain CTA. [address] [facility] complete  Patient eating and drinking well with no s/s of complications noted or reported. 1415Patient discharged, site was checked with every set of vitals. Site clean dry and intact. Discharge papers reviewed with patient, questions answered, discharge paper signed. Patient taken to door. Patient in stable condition, no s/s of complications noted or reported.

## 2023-05-10 NOTE — PROCEDURES
Patient arrived via pov with wife to Radiology department for biopsy. Allergies, home medications, H&P and fasting instructions reviewed with patient. Vital signs taken. 22 IV placed, blood obtained, IV flushed and prn adapter attached. Blood sample sent to lab for ordered tests. Procedural instructions given, questions answered, understanding expressed and consent signed. Patient given fluoroscopy education, no questions at this time.

## 2023-05-10 NOTE — PRE SEDATION
Sedation Pre-Procedure Note    Patient Name: Beck Foster   YOB: 1965  Room/Bed: Room/bed info not found  Medical Record Number: [id number]  Date: 5/10/2023   Time: 1:57 PM       Indication:  61year old male with a parenchymal lung mass seen in the left lower lobe. Consent: I have discussed with the patient and/or the patient representative the indication, alternatives, and the possible risks and/or complications of the planned procedure and the anesthesia methods. The patient and/or patient representative appear to understand and agree to proceed. Vital Signs:   Vitals:    05/10/23 1330   BP: 123/65   Pulse: 76   Resp: 18   Temp:    SpO2:        Past Medical History:   has a past medical history of Asthma and Hyperlipidemia. Past Surgical History:   has a past surgical history that includes Neck mass excision; Abdomen surgery (Left, 06/2020); colostomy (Right, 06/2020); and ventral hernia repair (Bilateral, 06/2021). Medications:   Scheduled Meds:   Continuous Infusions:   PRN Meds:   Home Meds:   Prior to Admission medications    Medication Sig Start Date End Date Taking? Authorizing Provider   bumetanide (BUMEX) 1 MG tablet Take 1 tablet by mouth daily   Yes Historical Provider, MD   spironolactone (ALDACTONE) 25 MG tablet Take 1 tablet by mouth daily   Yes Historical Provider, MD   amLODIPine (NORVASC) 10 MG tablet Take 1 tablet by mouth daily 9/15/22   Huong Bright MD   citalopram (CELEXA) 20 MG tablet Take 1 tablet by mouth daily    Historical Provider, MD   fluticasone-salmeterol (ADVAIR) 500-50 MCG/ACT AEPB diskus inhaler Inhale 1 puff into the lungs in the morning and 1 puff in the evening.     Historical Provider, MD   montelukast (SINGULAIR) 10 MG tablet Take 1 tablet by mouth daily    Historical Provider, MD   potassium chloride (KLOR-CON M) 20 MEQ extended release tablet Take 1 tablet by mouth daily    Historical Provider, MD   atorvastatin (LIPITOR) 40 MG tablet Take 1

## 2023-05-10 NOTE — BRIEF OP NOTE
Brief Postoperative Note      Patient: Bishnu Ramesh  YOB: 1965  MRN: [id number]    Date of Procedure: 5/10/2023  61year old male with parenchymal lung mass in the left lower lung field. Post-Op Diagnosis: Same       Left lower lobe parenchymal lung mass    Emilia Hill MD    Assistant:  * No surgical staff found *    Anesthesia: Conscious Sedation. Estimated Blood Loss (mL): Minimal    Complications: None    Specimens: Three 20 gauge core biopsy specimens obtained and sent to lab for analysis. Implants:  * No implants in log *      Drains:   Negative Pressure Wound Therapy Left;Lateral (Active)       Colostomy RUQ Transverse (Active)       Findings:   Percutaneous CT guided biopsy. Left lower lobe parenchymal lung mass. Three 20 gauge core biopsy specimens obtained and sent to lab for analysis.       Electronically signed by Gill Bellamy MD on 5/10/2023 at 2:01 PM